# Patient Record
Sex: FEMALE | Race: WHITE | NOT HISPANIC OR LATINO | Employment: OTHER | URBAN - METROPOLITAN AREA
[De-identification: names, ages, dates, MRNs, and addresses within clinical notes are randomized per-mention and may not be internally consistent; named-entity substitution may affect disease eponyms.]

---

## 2018-09-28 PROBLEM — D68.59 OTHER PRIMARY THROMBOPHILIA (HCC): Status: ACTIVE | Noted: 2018-09-28

## 2018-09-28 PROBLEM — I80.292: Status: ACTIVE | Noted: 2018-09-28

## 2023-02-03 PROBLEM — I82.729: Status: ACTIVE | Noted: 2023-02-03

## 2023-02-03 PROBLEM — J01.90 ACUTE SINUSITIS: Status: ACTIVE | Noted: 2023-02-03

## 2023-02-03 PROBLEM — U07.1 COVID-19: Status: ACTIVE | Noted: 2023-02-03

## 2023-02-03 PROBLEM — R26.89 IMBALANCE: Status: ACTIVE | Noted: 2023-02-03

## 2023-02-03 PROBLEM — G57.90 NEUROPATHY, LEG: Status: ACTIVE | Noted: 2023-02-03

## 2023-02-03 PROBLEM — D68.51 FACTOR V LEIDEN (MULTI): Status: ACTIVE | Noted: 2023-02-03

## 2023-02-03 PROBLEM — M10.9 GOUT: Status: ACTIVE | Noted: 2023-02-03

## 2023-02-03 PROBLEM — M79.89 LEG SWELLING: Status: ACTIVE | Noted: 2023-02-03

## 2023-02-03 PROBLEM — L60.0 INGROWN TOENAIL: Status: ACTIVE | Noted: 2023-02-03

## 2023-02-03 PROBLEM — E66.9 CLASS 1 OBESITY WITH BODY MASS INDEX (BMI) OF 34.0 TO 34.9 IN ADULT: Status: ACTIVE | Noted: 2023-02-03

## 2023-02-03 PROBLEM — J20.9 ACUTE BRONCHITIS: Status: ACTIVE | Noted: 2023-02-03

## 2023-02-03 PROBLEM — R39.9 URINARY TRACT INFECTION SYMPTOMS: Status: ACTIVE | Noted: 2023-02-03

## 2023-02-03 PROBLEM — I10 ESSENTIAL HYPERTENSION: Status: ACTIVE | Noted: 2023-02-03

## 2023-02-03 PROBLEM — E55.9 VITAMIN D DEFICIENCY: Status: ACTIVE | Noted: 2023-02-03

## 2023-02-03 PROBLEM — M79.673 FOOT PAIN: Status: ACTIVE | Noted: 2023-02-03

## 2023-02-03 PROBLEM — F41.9 ANXIETY: Status: ACTIVE | Noted: 2023-02-03

## 2023-02-03 PROBLEM — M12.30 PALINDROMIC RHEUMATISM: Status: ACTIVE | Noted: 2023-02-03

## 2023-02-03 PROBLEM — R26.81 GAIT INSTABILITY: Status: ACTIVE | Noted: 2023-02-03

## 2023-02-03 PROBLEM — K21.9 GERD (GASTROESOPHAGEAL REFLUX DISEASE): Status: ACTIVE | Noted: 2023-02-03

## 2023-02-03 PROBLEM — R42 DIZZINESS: Status: ACTIVE | Noted: 2023-02-03

## 2023-02-03 PROBLEM — H93.8X9 BLOCKED EAR: Status: ACTIVE | Noted: 2023-02-03

## 2023-02-03 PROBLEM — R42 VERTIGO: Status: ACTIVE | Noted: 2023-02-03

## 2023-02-03 PROBLEM — E87.6 HYPOKALEMIA: Status: ACTIVE | Noted: 2023-02-03

## 2023-02-03 PROBLEM — H81.09 ENDOLYMPHATIC HYDROPS: Status: ACTIVE | Noted: 2023-02-03

## 2023-02-03 PROBLEM — E79.0 HYPERURICEMIA: Status: ACTIVE | Noted: 2023-02-03

## 2023-02-03 PROBLEM — F41.8 DEPRESSION WITH ANXIETY: Status: ACTIVE | Noted: 2023-02-03

## 2023-02-03 PROBLEM — H90.3 BILATERAL SENSORINEURAL HEARING LOSS: Status: ACTIVE | Noted: 2023-02-03

## 2023-02-03 PROBLEM — E78.5 ELEVATED LIPIDS: Status: ACTIVE | Noted: 2023-02-03

## 2023-02-03 PROBLEM — M19.90 ARTHRITIS: Status: ACTIVE | Noted: 2023-02-03

## 2023-02-03 PROBLEM — H60.90 OTITIS EXTERNA: Status: ACTIVE | Noted: 2023-02-03

## 2023-02-03 PROBLEM — R60.9 EDEMA: Status: ACTIVE | Noted: 2023-02-03

## 2023-02-03 PROBLEM — R10.9 ABDOMINAL PAIN, ACUTE: Status: ACTIVE | Noted: 2023-02-03

## 2023-02-03 PROBLEM — E66.811 CLASS 1 OBESITY WITH BODY MASS INDEX (BMI) OF 34.0 TO 34.9 IN ADULT: Status: ACTIVE | Noted: 2023-02-03

## 2023-02-03 RX ORDER — LORAZEPAM 0.5 MG/1
0.5 TABLET ORAL 2 TIMES DAILY PRN
COMMUNITY
Start: 2015-07-31 | End: 2023-03-17 | Stop reason: SDUPTHER

## 2023-02-03 RX ORDER — PANTOPRAZOLE SODIUM 40 MG/1
40 FOR SUSPENSION ORAL DAILY
COMMUNITY
Start: 2021-01-12

## 2023-02-03 RX ORDER — LANOLIN ALCOHOL/MO/W.PET/CERES
1 CREAM (GRAM) TOPICAL DAILY
COMMUNITY
Start: 2022-03-28

## 2023-02-03 RX ORDER — METHYLPREDNISOLONE 4 MG/1
TABLET ORAL
COMMUNITY
Start: 2022-03-28 | End: 2023-03-17 | Stop reason: ALTCHOICE

## 2023-02-03 RX ORDER — FUROSEMIDE 20 MG/1
1 TABLET ORAL DAILY
COMMUNITY
Start: 2015-07-31 | End: 2023-03-17 | Stop reason: SDUPTHER

## 2023-02-03 RX ORDER — WARFARIN 3 MG/1
1 TABLET ORAL
COMMUNITY
End: 2023-09-28 | Stop reason: SDUPTHER

## 2023-02-03 RX ORDER — LANOLIN ALCOHOL/MO/W.PET/CERES
1 CREAM (GRAM) TOPICAL DAILY
COMMUNITY
Start: 2021-06-22 | End: 2023-10-03 | Stop reason: ALTCHOICE

## 2023-02-03 RX ORDER — LISINOPRIL 10 MG/1
0.5 TABLET ORAL DAILY
COMMUNITY
Start: 2015-09-28 | End: 2024-02-01 | Stop reason: SDUPTHER

## 2023-02-03 RX ORDER — FOLIC ACID 1 MG/1
1 TABLET ORAL DAILY
COMMUNITY
Start: 2018-08-16 | End: 2023-10-03 | Stop reason: ALTCHOICE

## 2023-02-03 RX ORDER — ALLOPURINOL 300 MG/1
1 TABLET ORAL DAILY
COMMUNITY
Start: 2019-01-08 | End: 2023-03-17 | Stop reason: SDUPTHER

## 2023-02-03 RX ORDER — METHOCARBAMOL 750 MG/1
1 TABLET, FILM COATED ORAL 3 TIMES DAILY PRN
COMMUNITY
Start: 2021-07-06 | End: 2023-03-29 | Stop reason: SDUPTHER

## 2023-02-03 RX ORDER — POTASSIUM CHLORIDE 750 MG/1
1 TABLET, EXTENDED RELEASE ORAL DAILY
COMMUNITY
Start: 2018-03-13 | End: 2024-02-02 | Stop reason: SDUPTHER

## 2023-03-17 ENCOUNTER — OFFICE VISIT (OUTPATIENT)
Dept: PRIMARY CARE | Facility: CLINIC | Age: 78
End: 2023-03-17
Payer: MEDICARE

## 2023-03-17 VITALS
TEMPERATURE: 98.1 F | HEIGHT: 61 IN | WEIGHT: 181 LBS | DIASTOLIC BLOOD PRESSURE: 82 MMHG | HEART RATE: 90 BPM | BODY MASS INDEX: 34.17 KG/M2 | SYSTOLIC BLOOD PRESSURE: 128 MMHG

## 2023-03-17 DIAGNOSIS — E79.0 HYPERURICEMIA: ICD-10-CM

## 2023-03-17 DIAGNOSIS — R60.0 PEDAL EDEMA: ICD-10-CM

## 2023-03-17 DIAGNOSIS — I82.409 RECURRENT DEEP VEIN THROMBOSIS (DVT) (MULTI): ICD-10-CM

## 2023-03-17 DIAGNOSIS — F41.9 ANXIETY: ICD-10-CM

## 2023-03-17 DIAGNOSIS — I10 ESSENTIAL HYPERTENSION: ICD-10-CM

## 2023-03-17 DIAGNOSIS — E66.09 CLASS 1 OBESITY DUE TO EXCESS CALORIES WITH SERIOUS COMORBIDITY AND BODY MASS INDEX (BMI) OF 34.0 TO 34.9 IN ADULT: Primary | ICD-10-CM

## 2023-03-17 PROBLEM — I82.729: Status: RESOLVED | Noted: 2023-02-03 | Resolved: 2023-03-17

## 2023-03-17 PROCEDURE — 99214 OFFICE O/P EST MOD 30 MIN: CPT | Performed by: INTERNAL MEDICINE

## 2023-03-17 PROCEDURE — 1159F MED LIST DOCD IN RCRD: CPT | Performed by: INTERNAL MEDICINE

## 2023-03-17 PROCEDURE — 1160F RVW MEDS BY RX/DR IN RCRD: CPT | Performed by: INTERNAL MEDICINE

## 2023-03-17 PROCEDURE — 3079F DIAST BP 80-89 MM HG: CPT | Performed by: INTERNAL MEDICINE

## 2023-03-17 PROCEDURE — 3074F SYST BP LT 130 MM HG: CPT | Performed by: INTERNAL MEDICINE

## 2023-03-17 RX ORDER — FUROSEMIDE 20 MG/1
20 TABLET ORAL DAILY
Qty: 30 TABLET | Refills: 2 | Status: SHIPPED | OUTPATIENT
Start: 2023-03-17 | End: 2023-08-25 | Stop reason: SDUPTHER

## 2023-03-17 RX ORDER — ALLOPURINOL 300 MG/1
300 TABLET ORAL DAILY
Qty: 90 TABLET | Refills: 1 | Status: SHIPPED | OUTPATIENT
Start: 2023-03-17 | End: 2023-10-03 | Stop reason: ALTCHOICE

## 2023-03-17 RX ORDER — LORAZEPAM 0.5 MG/1
0.5 TABLET ORAL 2 TIMES DAILY PRN
Qty: 30 TABLET | Refills: 0 | Status: SHIPPED | OUTPATIENT
Start: 2023-03-17

## 2023-03-17 ASSESSMENT — ENCOUNTER SYMPTOMS
CARDIOVASCULAR NEGATIVE: 1
PSYCHIATRIC NEGATIVE: 1
OCCASIONAL FEELINGS OF UNSTEADINESS: 0
CONSTITUTIONAL NEGATIVE: 1
RESPIRATORY NEGATIVE: 1
NEUROLOGICAL NEGATIVE: 1
EYES NEGATIVE: 1
MUSCULOSKELETAL NEGATIVE: 1
ENDOCRINE NEGATIVE: 1
LOSS OF SENSATION IN FEET: 0
DEPRESSION: 0
GASTROINTESTINAL NEGATIVE: 1

## 2023-03-17 NOTE — PROGRESS NOTES
"Subjective   Patient ID: Keyana Barcenas is a 78 y.o. female who presents for Follow-up (Follow up/Needs refills/).    HPI     Review of Systems   Constitutional: Negative.    HENT: Negative.     Eyes: Negative.    Respiratory: Negative.     Cardiovascular: Negative.    Gastrointestinal: Negative.    Endocrine: Negative.    Genitourinary: Negative.    Musculoskeletal: Negative.    Neurological: Negative.    Psychiatric/Behavioral: Negative.     All other systems reviewed and are negative.      Objective   /82   Pulse 90   Temp 36.7 °C (98.1 °F) (Temporal)   Ht 1.549 m (5' 1\")   Wt 82.1 kg (181 lb)   BMI 34.20 kg/m²     Physical Exam  Vitals reviewed.   Constitutional:       Appearance: Normal appearance.   HENT:      Head: Normocephalic.   Eyes:      Extraocular Movements: Extraocular movements intact.      Pupils: Pupils are equal, round, and reactive to light.   Cardiovascular:      Rate and Rhythm: Normal rate and regular rhythm.   Pulmonary:      Effort: Pulmonary effort is normal.      Breath sounds: Normal breath sounds.   Abdominal:      Palpations: Abdomen is soft.   Musculoskeletal:         General: Normal range of motion.   Neurological:      General: No focal deficit present.      Mental Status: She is alert. She is disoriented.   Psychiatric:         Mood and Affect: Mood normal.         Behavior: Behavior normal.         Thought Content: Thought content normal.         Judgment: Judgment normal.         Assessment/Plan   Problem List Items Addressed This Visit          Circulatory    Essential hypertension     On Lisinopril ,follow DASH diet.         Recurrent deep vein thrombosis (DVT) (CMS/HCC)     Factor V ,on chronic warfarin.             Musculoskeletal    Pedal edema     Follow sodium diet plus Lasix as needed as needed         Relevant Medications    furosemide (Lasix) 20 mg tablet       Endocrine/Metabolic    Class 1 obesity with body mass index (BMI) of 34.0 to 34.9 in " adult - Primary     Diet exercise weight loss to reduce cardiometabolic risk.  Even losing 5 to 10 pounds can reduce risk of health related problems.            Other    Anxiety    Relevant Medications    LORazepam (Ativan) 0.5 mg tablet    Hyperuricemia    Relevant Medications    allopurinol (Zyloprim) 300 mg tablet

## 2023-03-17 NOTE — ASSESSMENT & PLAN NOTE
Diet exercise weight loss to reduce cardiometabolic risk.  Even losing 5 to 10 pounds can reduce risk of health related problems.

## 2023-03-29 DIAGNOSIS — M19.90 ARTHRITIS: ICD-10-CM

## 2023-03-29 RX ORDER — METHOCARBAMOL 750 MG/1
750 TABLET, FILM COATED ORAL 3 TIMES DAILY PRN
Qty: 30 TABLET | Refills: 1 | Status: SHIPPED | OUTPATIENT
Start: 2023-03-29 | End: 2024-02-02 | Stop reason: SDUPTHER

## 2023-08-25 DIAGNOSIS — R60.0 PEDAL EDEMA: ICD-10-CM

## 2023-08-30 RX ORDER — FUROSEMIDE 20 MG/1
20 TABLET ORAL DAILY
Qty: 90 TABLET | Refills: 1 | Status: SHIPPED | OUTPATIENT
Start: 2023-08-30 | End: 2024-02-02 | Stop reason: SDUPTHER

## 2023-09-22 ENCOUNTER — APPOINTMENT (OUTPATIENT)
Dept: PRIMARY CARE | Facility: CLINIC | Age: 78
End: 2023-09-22
Payer: MEDICARE

## 2023-09-28 DIAGNOSIS — M19.90 ARTHRITIS: ICD-10-CM

## 2023-09-28 RX ORDER — WARFARIN 3 MG/1
3 TABLET ORAL ONCE
Qty: 90 TABLET | Refills: 0 | Status: SHIPPED | OUTPATIENT
Start: 2023-09-28 | End: 2023-10-03 | Stop reason: ALTCHOICE

## 2023-10-03 ENCOUNTER — OFFICE VISIT (OUTPATIENT)
Dept: CARDIOLOGY | Facility: CLINIC | Age: 78
End: 2023-10-03
Payer: MEDICARE

## 2023-10-03 VITALS
WEIGHT: 183.6 LBS | HEART RATE: 84 BPM | SYSTOLIC BLOOD PRESSURE: 126 MMHG | HEIGHT: 59 IN | BODY MASS INDEX: 37.01 KG/M2 | DIASTOLIC BLOOD PRESSURE: 64 MMHG

## 2023-10-03 DIAGNOSIS — R07.9 CHEST PAIN, UNSPECIFIED TYPE: ICD-10-CM

## 2023-10-03 PROCEDURE — 1159F MED LIST DOCD IN RCRD: CPT | Performed by: INTERNAL MEDICINE

## 2023-10-03 PROCEDURE — 99204 OFFICE O/P NEW MOD 45 MIN: CPT | Performed by: INTERNAL MEDICINE

## 2023-10-03 PROCEDURE — 1036F TOBACCO NON-USER: CPT | Performed by: INTERNAL MEDICINE

## 2023-10-03 PROCEDURE — 99244 OFF/OP CNSLTJ NEW/EST MOD 40: CPT | Performed by: INTERNAL MEDICINE

## 2023-10-03 PROCEDURE — 3074F SYST BP LT 130 MM HG: CPT | Performed by: INTERNAL MEDICINE

## 2023-10-03 PROCEDURE — 3078F DIAST BP <80 MM HG: CPT | Performed by: INTERNAL MEDICINE

## 2023-10-03 PROCEDURE — 1160F RVW MEDS BY RX/DR IN RCRD: CPT | Performed by: INTERNAL MEDICINE

## 2023-10-03 RX ORDER — ALLOPURINOL 300 MG/1
1 TABLET ORAL DAILY
COMMUNITY
End: 2024-02-02 | Stop reason: SDUPTHER

## 2023-10-03 RX ORDER — REGADENOSON 0.08 MG/ML
0.4 INJECTION, SOLUTION INTRAVENOUS
Status: SHIPPED | OUTPATIENT
Start: 2023-10-03

## 2023-10-03 RX ORDER — FUROSEMIDE 20 MG/1
1 TABLET ORAL DAILY
COMMUNITY
End: 2023-10-03 | Stop reason: ALTCHOICE

## 2023-10-03 RX ORDER — FOLIC ACID 1 MG/1
1 TABLET ORAL DAILY
COMMUNITY

## 2023-10-03 RX ORDER — WARFARIN 4 MG/1
TABLET ORAL
COMMUNITY

## 2023-10-03 RX ORDER — LANOLIN ALCOHOL/MO/W.PET/CERES
1 CREAM (GRAM) TOPICAL DAILY
COMMUNITY

## 2023-10-03 RX ORDER — LISINOPRIL 10 MG/1
1 TABLET ORAL DAILY
COMMUNITY
End: 2023-10-03 | Stop reason: ALTCHOICE

## 2023-10-03 NOTE — PROGRESS NOTES
CARDIOLOGY OFFICE VISIT  78-year-old female asked to see us after her visit to the emergency room where she was having chest and arm pain.  She was seen by the hospital ER doctor.  Serial troponins were performed which were negative.  Symptoms resolved without much treatment.  She was given some fluids and some other occasions because of some lightheadedness and dizziness.  Her EKG showed no significant abnormalities.  She was discharged on her usual medications and asked to make a follow-up appointment.  Patient has not been here before.  Her  was a former patient of mine and I do know her family especially her daughter Carolyn who is a nurse at the hospital.  Presently she is feeling well.  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  She has various other minor medical conditions per se.  She is directly free of any coronary disease and apparently had a heart cath 25 years ago which was normal.  She is on no active cardiac medications and follows with her primary care physician and hematology.  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 (CMS/[facility]) 01/12/2021    Drug or chemical induced diabetes mellitus, controlled, with hyperglycemia    Gout, unspecified 05/04/2022    Gout    Personal history of other specified conditions 12/18/2016    History of fatigue    Personal history of other venous thrombosis and embolism 04/01/2019    History of deep venous thrombosis    Personal  history of other venous thrombosis and embolism 12/18/2016    History of deep venous thrombosis    Personal history of urinary (tract) infections 12/09/2016    History of UTI       Social History  Social History     Tobacco Use    Smoking status: Former     Packs/day: 0.50     Years: 25.00     Additional pack years: 0.00     Total pack years: 12.50     Types: Cigarettes    Smokeless tobacco: Never   Substance Use Topics    Alcohol use: Not Currently    Drug use: Never       Family History     Family History   Problem Relation Name Age of Onset    Coronary artery disease Mother      Breast cancer Mother      Rheum arthritis Mother      Colon cancer Son      Colon cancer Maternal Grandmother          Allergies:  Allergies   Allergen Reactions    Erythromycin Unknown    Erythromycin-Benzoyl Peroxide Unknown    Iodinated Contrast Media Unknown    Povidone-Iodine Unknown        Outpatient Medications:  Current Outpatient Medications   Medication Instructions    allopurinol (Zyloprim) 300 mg tablet 1 tablet, oral, Daily    cyanocobalamin (Vitamin B-12) 1,000 mcg tablet 1 tablet, oral, Daily, As directed    folic acid (Folvite) 1 mg tablet 1 tablet, oral, Daily    furosemide (LASIX) 20 mg, oral, Daily, As directed    lisinopril 10 mg tablet 0.5 tablets, oral, Daily    LORazepam (ATIVAN) 0.5 mg, oral, 2 times daily PRN    methocarbamol (ROBAXIN) 750 mg, oral, 3 times daily PRN    pantoprazole (PROTONIX) 40 mg, oral, Daily    potassium chloride CR 10 mEq ER tablet 1 tablet, oral, Daily    pyridoxine (Vitamin B-6) 50 mg tablet 1 tablet, oral, Daily    warfarin (Coumadin) 4 mg tablet TAKE 1 TABLET BY MOUTH DAILY FOR 5 DAYS AND 1/2 TABLET DAILY 2 DAYS A WEEK          REVIEW OF SYSTEMS  ROS      VITALS  Vitals:    10/03/23 1344   BP: 126/64   Pulse: 84       PHYSICAL EXAM  Constitutional:       Appearance: Healthy appearance. Not in distress.   Neck:      Vascular: No JVR. JVD normal.   Pulmonary:      Effort: Pulmonary effort  "is normal.      Breath sounds: Normal breath sounds. No wheezing. No rhonchi. No rales.   Chest:      Chest wall: Not tender to palpatation.   Cardiovascular:      PMI at left midclavicular line. Normal rate. Regular rhythm. Normal S1. Normal S2.       Murmurs: There is no murmur.      No gallop.  No click. No rub.   Pulses:     Intact distal pulses.   Edema:     Peripheral edema absent.   Abdominal:      General: Bowel sounds are normal.      Palpations: Abdomen is soft.      Tenderness: There is no abdominal tenderness.   Musculoskeletal: Normal range of motion.         General: No tenderness. Skin:     General: Skin is warm and dry.   Neurological:      General: No focal deficit present.      Mental Status: Alert and oriented to person, place and time.          REVIEW OF RECENT LABS AND TESTING  Lab Results   Component Value Date    GLUCOSE 118 (H) 09/29/2023    CALCIUM 8.7 09/29/2023     (L) 09/29/2023    K 3.6 09/29/2023    CO2 27 09/29/2023    CL 97 (L) 09/29/2023    BUN 15 09/29/2023    CREATININE 0.84 09/29/2023     Lab Results   Component Value Date    CKTOTAL 32 09/28/2023    TROPONINI 0.03 11/17/2021      Lab Results   Component Value Date    CHOL 187 06/28/2021    CHOL 201 (H) 07/24/2019     Lab Results   Component Value Date    HDL 49.0 06/28/2021    HDL 48.0 07/24/2019     No results found for: \"LDLCALC\"  Lab Results   Component Value Date    TRIG 120 06/28/2021    TRIG 164 (H) 07/24/2019     No components found for: \"CHOLHDL\"   Lab Results   Component Value Date    WBC 14.4 (H) 09/29/2023    HGB 13.3 09/29/2023    HCT 40.7 09/29/2023    MCV 96 09/29/2023     09/29/2023        Cardiac Studies    No echocardiogram results found for the past 12 months     No results found for this or any previous visit (from the past 4464 hour(s)).       Cath 25 years ago reportedly normal      Normal sinus rhythm with sinus arrhythmia  Left axis deviation  Poor R Wave Progression  Abnormal ECG  When " compared with ECG of 28-SEP-2023 22:07,  No significant change was found  Confirmed by Raj Garcia ([id number]) on 10/3/2023 2:06:23 PM   ASSESSMENT AND PLAN  78-year-old female seen and evaluated here in the office after recent emergency room visit for atypical chest pain.    Chart was reviewed in detail including EKGs labs hospital notes and details of the patient's past medical history discussed with the patient and her daughter.    Current meds allergies vitals and examination as noted.    Impression:  Atypical chest pain  History of normal heart cath  Obesity  Hypertension  Protein C deficiency and factor V Leiden deficiency  Chronic Coumadin therapy  Recurrent blood clots    Recommendation:  Continue current meds  Schedule perfusion stress test echo and calcium score  See me afterwards to review  Follow-up with primary care and hematology  INR in the 2-3 range advised  Call if any problems or issues develop    [unfilled]

## 2023-10-04 ENCOUNTER — TRANSCRIBE ORDERS (OUTPATIENT)
Dept: WOMENS IMAGING | Age: 78
End: 2023-10-04

## 2023-10-04 DIAGNOSIS — I80.202: Primary | ICD-10-CM

## 2023-10-04 DIAGNOSIS — I87.002 POSTTHROMBOTIC SYNDROME WITHOUT COMPLICATIONS OF LEFT LOWER EXTREMITY: ICD-10-CM

## 2023-10-05 ENCOUNTER — HOSPITAL ENCOUNTER (OUTPATIENT)
Dept: ULTRASOUND IMAGING | Age: 78
Discharge: HOME | End: 2023-10-07
Payer: MEDICARE

## 2023-10-05 ENCOUNTER — TELEPHONE (OUTPATIENT)
Dept: CARDIOLOGY | Facility: CLINIC | Age: 78
End: 2023-10-05

## 2023-10-05 DIAGNOSIS — R94.31 ABNORMAL EKG: ICD-10-CM

## 2023-10-05 DIAGNOSIS — M79.602 PAIN OF LEFT UPPER EXTREMITY: ICD-10-CM

## 2023-10-05 PROCEDURE — 93971 EXTREMITY STUDY: CPT

## 2023-10-05 NOTE — TELEPHONE ENCOUNTER
Dara from scheduling called asking if the ECHO orders will be placed for this patient. She noticed Dr. Garcia note stated he wanted patient to get ECHO.

## 2023-10-06 ENCOUNTER — APPOINTMENT (OUTPATIENT)
Dept: PRIMARY CARE | Facility: CLINIC | Age: 78
End: 2023-10-06
Payer: MEDICARE

## 2023-10-06 DIAGNOSIS — R94.31 ABNORMAL EKG: Primary | ICD-10-CM

## 2023-10-06 DIAGNOSIS — R07.9 CHEST PAIN, UNSPECIFIED TYPE: ICD-10-CM

## 2023-10-06 NOTE — PROGRESS NOTES
Dr. Raj Garcia DO ordered ECHO at last OV. Request to place in charge was given and done. Sent to Dr. Raj Garcia DO for sign off.    Location of patient: [place]    Received call from Chelsie Robert at [facility] with [employer]. Subjective: Caller states \"the left side of my face is swollen, I have neck pain under my left ear and swelling and tingling under my left eye\"     I just started taking 5 mg Amlodipine on 3/17 which was ordered by my cardiologist    When I turn my neck I have pain- it feels like something is pulling    Current Symptoms:     Onset: 2 days ago; worsening    Associated Symptoms:     Pain Severity: 3/10; sharp, pulling; intermittent    Temperature:      What has been tried: Benadryl    LMP: NA Pregnant: NA    Recommended disposition: Go to [facility]    Care advice provided, patient verbalizes understanding; denies any other questions or concerns; instructed to call back for any new or worsening symptoms. Patient/Caller agrees with recommended disposition; writer provided warm transfer to Dale Sandhu at [facility] for appointment scheduling    Attention Provider: Thank you for allowing me to participate in the care of your patient. The patient was connected to triage in response to information provided to the ECC/PSC. Please do not respond through this encounter as the response is not directed to a shared pool. Reason for Disposition   Face swelling began after taking a drug    Protocols used:  Face Swelling-ADULT-[place]

## 2023-10-09 ENCOUNTER — APPOINTMENT (OUTPATIENT)
Dept: ORTHOPEDIC SURGERY | Facility: CLINIC | Age: 78
End: 2023-10-09
Payer: MEDICARE

## 2023-10-17 ENCOUNTER — APPOINTMENT (OUTPATIENT)
Dept: PRIMARY CARE | Facility: CLINIC | Age: 78
End: 2023-10-17
Payer: MEDICARE

## 2023-10-20 ENCOUNTER — OFFICE VISIT (OUTPATIENT)
Dept: ORTHOPEDIC SURGERY | Facility: CLINIC | Age: 78
End: 2023-10-20
Payer: MEDICARE

## 2023-10-20 VITALS — HEIGHT: 59 IN | BODY MASS INDEX: 36.49 KG/M2 | WEIGHT: 181 LBS

## 2023-10-20 DIAGNOSIS — M19.019 GLENOHUMERAL ARTHRITIS: Primary | ICD-10-CM

## 2023-10-20 PROCEDURE — 20610 DRAIN/INJ JOINT/BURSA W/O US: CPT | Mod: LT | Performed by: ORTHOPAEDIC SURGERY

## 2023-10-20 PROCEDURE — 2500000005 HC RX 250 GENERAL PHARMACY W/O HCPCS: Performed by: ORTHOPAEDIC SURGERY

## 2023-10-20 PROCEDURE — 1160F RVW MEDS BY RX/DR IN RCRD: CPT | Performed by: ORTHOPAEDIC SURGERY

## 2023-10-20 PROCEDURE — 99213 OFFICE O/P EST LOW 20 MIN: CPT | Performed by: ORTHOPAEDIC SURGERY

## 2023-10-20 PROCEDURE — 1036F TOBACCO NON-USER: CPT | Performed by: ORTHOPAEDIC SURGERY

## 2023-10-20 PROCEDURE — 1125F AMNT PAIN NOTED PAIN PRSNT: CPT | Performed by: ORTHOPAEDIC SURGERY

## 2023-10-20 PROCEDURE — 99203 OFFICE O/P NEW LOW 30 MIN: CPT | Performed by: ORTHOPAEDIC SURGERY

## 2023-10-20 PROCEDURE — 1159F MED LIST DOCD IN RCRD: CPT | Performed by: ORTHOPAEDIC SURGERY

## 2023-10-20 PROCEDURE — 2500000004 HC RX 250 GENERAL PHARMACY W/ HCPCS (ALT 636 FOR OP/ED): Performed by: ORTHOPAEDIC SURGERY

## 2023-10-20 RX ORDER — TRIAMCINOLONE ACETONIDE 40 MG/ML
40 INJECTION, SUSPENSION INTRA-ARTICULAR; INTRAMUSCULAR
Status: COMPLETED | OUTPATIENT
Start: 2023-10-20 | End: 2023-10-20

## 2023-10-20 RX ORDER — LIDOCAINE HYDROCHLORIDE 10 MG/ML
8 INJECTION INFILTRATION; PERINEURAL
Status: COMPLETED | OUTPATIENT
Start: 2023-10-20 | End: 2023-10-20

## 2023-10-20 RX ADMIN — LIDOCAINE HYDROCHLORIDE 8 ML: 10 INJECTION, SOLUTION INFILTRATION; PERINEURAL at 12:27

## 2023-10-20 RX ADMIN — TRIAMCINOLONE ACETONIDE 40 MG: 40 INJECTION, SUSPENSION INTRA-ARTICULAR; INTRAMUSCULAR at 12:27

## 2023-10-20 ASSESSMENT — PAIN SCALES - GENERAL: PAINLEVEL_OUTOF10: 3

## 2023-10-20 ASSESSMENT — PAIN - FUNCTIONAL ASSESSMENT: PAIN_FUNCTIONAL_ASSESSMENT: 0-10

## 2023-10-20 NOTE — PROGRESS NOTES
History of Present Illness   Patient presents with left shoulder pain has been acting up the past few months.  She has a history of cortisone shot that did give her relief.  She is requesting a new cortisone shot today.  She currently not taking anti-inflammatories she is currently on Coumadin     Review of Systems   GENERAL: Negative for malaise, significant weight loss, fever  MUSCULOSKELETAL: see HPI  NEURO:  Negative     Assessment   Left shoulder glenohumeral osteoarthritis     Plan  Performed a cortisone shot to the left shoulder today.  Patient had immediate relief and improvement of her range of motion after the injection  See her back in 1 month for follow-up      History of Present Illness   Chief Complaint   Patient presents with    Right Shoulder - Pain     Rt shoulder pain, xrays at        History of Present Illness   Patient presents with left shoulder pain has been acting up the past few months.  She has a history of cortisone shot that did give her relief.  She is requesting a new cortisone shot today.  She currently not taking anti-inflammatories she is currently on Coumadin.  Pain relieved by rest and aggravated by use    Imaging  Shoulder: No acute fractures dislocations.  Moderate glenohumeral arthritis     Assessment:   Left shoulder arthritis    Plan:  We reviewed the role of imaging, physical therapy, injections and the time frame to healing and correlation with outcome.  1.  Patient like to do a cortisone injection today  2.  NSAID: Deferred secondary to Coumadin.  3.  Ice: 30 minutes on and off  4.  Exercise home program: Medically directed Shoulder therapy / Handout given  5.  Injection options discussed   L Inj/Asp: L glenohumeral on 10/20/2023 12:27 PM  Indications: pain  Details: 22 G needle, posterior approach  Medications: 8 mL lidocaine 10 mg/mL (1 %); 40 mg triamcinolone acetonide 40 mg/mL  Outcome: tolerated well, no immediate complications  Procedure, treatment alternatives,  risks and benefits explained, specific risks discussed. Consent was given by the patient. Immediately prior to procedure a time out was called to verify the correct patient, procedure, equipment, support staff and site/side marked as required. Patient was prepped and draped in the usual sterile fashion.          Physical Exam  Well-nourished, well-developed. No acute distress. Alert and oriented x3. Responds appropriately to questioning. Good mood. Normal affect.  Physical Exam  Left shoulder:  Skin healthy to gross inspection. No ecchymosis, no swelling, no gross atrophy  No tenderness to palpation over acromioclavicular joint  Mild pain in the biceps  ROM: 130 forward flexion, external rotation 20 degrees  Strength: 5/5 Resisted elevation, external rotation     Pain with lift off and Speeds test + impingement  Negative Spurling´s test  Positive Neer and Hawkin´s test  Neurovascular exam normal distally     Review of Systems   GENERAL: Negative for malaise, significant weight loss, fever  MUSCULOSKELETAL: See HPI  NEURO:  Negative     Past Medical History:   Diagnosis Date    Activated protein C resistance (CMS/[facility]) 12/18/2016    Factor 5 Leiden mutation, heterozygous    Anxiety disorder, unspecified 03/28/2022    Anxiety    Cervicalgia     Neck pain    Deficiency of other specified B group vitamins 12/18/2016    Vitamin B12 deficiency    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 (CMS/[facility]) 01/12/2021    Drug or chemical induced diabetes mellitus, controlled, with hyperglycemia    Gout, unspecified 05/04/2022    Gout    Personal history of other specified conditions 12/18/2016    History of fatigue    Personal history of other venous thrombosis and embolism 04/01/2019    History of deep venous thrombosis    Personal history of other venous thrombosis and embolism 12/18/2016    History of deep venous thrombosis    Personal history of urinary (tract) infections 12/09/2016    History of UTI        Medication Documentation Review Audit       Reviewed by Gail Escalera MA (Medical Assistant) on 10/20/23 at 0937      Medication Order Taking? Sig Documenting Provider Last Dose Status   allopurinol (Zyloprim) 300 mg tablet [id number] No Take 1 tablet (300 mg) by mouth once daily. Historical Provider, MD Taking Active   cyanocobalamin (Vitamin B-12) 1,000 mcg tablet [id number] No Take 1 tablet (1,000 mcg) by mouth once daily. As directed Historical Provider, MD Taking Active   folic acid (Folvite) 1 mg tablet [id number] No Take 1 tablet (1 mg) by mouth once daily. Historical Provider, MD Taking Active   furosemide (Lasix) 20 mg tablet [id number] No Take 1 tablet (20 mg) by mouth once daily. As directed Marj Weston MD Taking Active   lisinopril 10 mg tablet [id number] No Take 0.5 tablets (5 mg) by mouth once daily. Historical Provider, MD Taking Active   LORazepam (Ativan) 0.5 mg tablet [id number] No Take 1 tablet (0.5 mg) by mouth 2 times a day as needed for anxiety. Marj Weston MD Taking Active   methocarbamol (Robaxin) 750 mg tablet [id number] No Take 1 tablet (750 mg) by mouth 3 times a day as needed for muscle spasms. Marj Weston MD Taking Active   pantoprazole (ProtoNix) 40 mg packet [id number] No Take 1 packet (40 mg) by mouth once daily. Historical MD Cristobal Not Taking Active   potassium chloride CR 10 mEq ER tablet [id number] No Take 1 tablet (10 mEq) by mouth once daily. Mono Provider, MD Taking Active   pyridoxine (Vitamin B-6) 50 mg tablet [id number] No Take 1 tablet (50 mg) by mouth once daily. Historical Provider, MD Taking Active   regadenoson (Lexiscan) injection 0.4 mg [id number]   Raj Garcia,   Active   warfarin (Coumadin) 4 mg tablet [id number] No TAKE 1 TABLET BY MOUTH DAILY FOR 5 DAYS AND 1/2 TABLET DAILY 2 DAYS A WEEK Historical Provider, MD Taking Active                    Allergies   Allergen Reactions    Erythromycin Unknown    Erythromycin-Benzoyl Peroxide  Unknown    Iodinated Contrast Media Unknown    Povidone-Iodine Unknown       Social History     Socioeconomic History    Marital status:      Spouse name: Not on file    Number of children: Not on file    Years of education: Not on file    Highest education level: Not on file   Occupational History    Not on file   Tobacco Use    Smoking status: Former     Packs/day: 0.50     Years: 25.00     Additional pack years: 0.00     Total pack years: 12.50     Types: Cigarettes    Smokeless tobacco: Never   Substance and Sexual Activity    Alcohol use: Not Currently    Drug use: Never    Sexual activity: Not on file   Other Topics Concern    Not on file   Social History Narrative    Not on file     Social Determinants of Health     Financial Resource Strain: Not on file   Food Insecurity: Not on file   Transportation Needs: Not on file   Physical Activity: Not on file   Stress: Not on file   Social Connections: Not on file   Intimate Partner Violence: Not on file   Housing Stability: Not on file       Past Surgical History:   Procedure Laterality Date    BLADDER SURGERY  12/18/2016    Bladder Surgery    CARDIAC CATHETERIZATION      CATARACT EXTRACTION  12/18/2016    Cataract Extraction    FL DECLOT BY THROMOBOLYTIC AGENT OF VAD OR CATHETER GUIDANCE Left     HYSTERECTOMY  12/18/2016    Hysterectomy    TONSILLECTOMY  12/18/2016    Tonsillectomy       Vascular duplex upper extremity venous left    Result Date: 10/5/2023  EXAMINATION: VENOUS ULTRASOUND OF THE LEFT UPPER EXTREMITY, 10/5/2023 11:02 am TECHNIQUE: Duplex ultrasound using B-mode/gray scaled imaging and Doppler spectral analysis and color flow was obtained of the deep venous structures of the left upper extremity. COMPARISON: None. HISTORY: ORDERING SYSTEM PROVIDED HISTORY: LUE pain, history of DVTs FINDINGS: There is normal flow and compressibility of the visualized venous structures. There is no evidence of echogenic thrombus. The veins demonstrate good  compressibility with normal color flow study and spectral analysis.    No evidence of DVT in left upper extremity.    Electrocardiogram 12 Lead    Result Date: 10/3/2023  Normal sinus rhythm with sinus arrhythmia Left anterior fascicular block Poor R Wave Progression Abnormal ECG Confirmed by Raj Garcia ([id number]) on 10/3/2023 2:06:51 PM    Electrocardiogram 12 Lead    Result Date: 10/3/2023  Normal sinus rhythm with sinus arrhythmia Left axis deviation Poor R Wave Progression Abnormal ECG When compared with ECG of 28-SEP-2023 22:07, No significant change was found Confirmed by Raj Garcia ([id number]) on 10/3/2023 2:06:23 PM    CT head wo IV contrast    Result Date: 9/29/2023  Interpreted By:  SAVI MARTINEZ MD MRN: [id number] Patient Name: VELIA ALDRIDGE  STUDY: CT HEAD WO CONTRAST;  9/29/2023 12:51 pm  INDICATION: lightheaded, elevated INR .  COMPARISON: 08/05/2022 reporting no abnormality.  ACCESSION NUMBER(S): [id number]  ORDERING CLINICIAN: DANIELLE LOPEZ  TECHNIQUE: Noncontrast axial CT scan of head was performed. Angled reformats in brain and bone windows were generated. The images were reviewed in bone, brain, blood and soft tissue windows.  FINDINGS: No acute edema. No acute hemorrhage. No mass effect. Ventricular system is normal for age. No extra-axial fluid collections. Orbits are normal. Paranasal sinuses are clear.       No acute findings.    XR chest 1 view    Result Date: 9/29/2023  Interpreted By:  JONAH GAMEZ MD STUDY: Chest Radiograph;  9/29/2023, 10:22AM.  INDICATION: Chest pain.  COMPARISON: CT CAP 8/5/2022.  CXR 11/17/2021.  ACCESSION NUMBER(S): [id number]  ORDERING CLINICIAN: DANIELLE LOPEZ MD  TECHNIQUE:  Frontal chest was obtained at 11:04 hours.  FINDINGS:  CARDIOMEDIASTINAL SILHOUETTE: Cardiomediastinal silhouette is normal in size and configuration. There is a retrocardiac mass consistent with a hiatal hernia.  LUNGS: Lungs are clear.  ABDOMEN: No remarkable upper  abdominal findings.  BONES: No acute osseous changes.      No evidence consolidating infiltrate or effusion.   Signed by David Henderson MD    XR shoulder    Result Date: 9/28/2023  Interpreted By:  ELIZABETH JOHN MD MRN: [id number] Patient Name: VELIA ALDRIDGE  STUDY: WRIST; 2 VIEWS; SHOULDER, CMPLT, MIN 2 VIEWS; Left;  9/28/2023 10:37 pm  INDICATION: pain over distal radial styloid ; eval for fx. pain over proximal humeral head and scapula .  COMPARISON: None.  ACCESSION NUMBER(S): [id number]; [id number]  ORDERING CLINICIAN: MARLYNAID MARIA M  FINDINGS: Left wrist: Demineralization soft tissue swelling. Distal radioulnar joint osteoarthritic change as well as some cystic change in the lunate suggesting abutment  No definite fracture is seen. Demineralization does limit sensitivity. Severe base of thumb osteoarthritis.  Left shoulder: Severe left glenohumeral osteoarthritis is seen. Probable joint effusion detected. No acute fracture or malalignment.      Severe left glenohumeral osteoarthritic change. Demineralization. Soft tissue swelling seen about the left wrist with degenerative change. No acute fracture. If concern for synovitis, consider MRI   MACRO: None    XR wrist    Result Date: 9/28/2023  Interpreted By:  ELIZABETH JOHN MD MRN: [id number] Patient Name: VELIA ALDRIDGE  STUDY: WRIST; 2 VIEWS; SHOULDER, CMPLT, MIN 2 VIEWS; Left;  9/28/2023 10:37 pm  INDICATION: pain over distal radial styloid ; eval for fx. pain over proximal humeral head and scapula .  COMPARISON: None.  ACCESSION NUMBER(S): [id number]; [id number]  ORDERING CLINICIAN: ANASTACIO MARIA M  FINDINGS: Left wrist: Demineralization soft tissue swelling. Distal radioulnar joint osteoarthritic change as well as some cystic change in the lunate suggesting abutment  No definite fracture is seen. Demineralization does limit sensitivity. Severe base of thumb osteoarthritis.  Left shoulder: Severe left glenohumeral osteoarthritis is seen. Probable joint effusion  detected. No acute fracture or malalignment.      Severe left glenohumeral osteoarthritic change. Demineralization. Soft tissue swelling seen about the left wrist with degenerative change. No acute fracture. If concern for synovitis, consider MRI   MACRO: None

## 2023-11-08 ENCOUNTER — APPOINTMENT (OUTPATIENT)
Dept: RADIOLOGY | Facility: CLINIC | Age: 78
End: 2023-11-08
Payer: MEDICARE

## 2023-11-08 ENCOUNTER — APPOINTMENT (OUTPATIENT)
Dept: CARDIOLOGY | Facility: CLINIC | Age: 78
End: 2023-11-08
Payer: MEDICARE

## 2023-11-10 ENCOUNTER — APPOINTMENT (OUTPATIENT)
Dept: ORTHOPEDIC SURGERY | Facility: CLINIC | Age: 78
End: 2023-11-10
Payer: MEDICARE

## 2023-11-13 ENCOUNTER — APPOINTMENT (OUTPATIENT)
Dept: PRIMARY CARE | Facility: CLINIC | Age: 78
End: 2023-11-13
Payer: MEDICARE

## 2023-11-15 ENCOUNTER — APPOINTMENT (OUTPATIENT)
Dept: PRIMARY CARE | Facility: CLINIC | Age: 78
End: 2023-11-15
Payer: MEDICARE

## 2023-12-04 DIAGNOSIS — R60.0 PEDAL EDEMA: ICD-10-CM

## 2023-12-04 RX ORDER — FUROSEMIDE 20 MG/1
20 TABLET ORAL DAILY
Qty: 90 TABLET | Refills: 1 | OUTPATIENT
Start: 2023-12-04

## 2023-12-05 ENCOUNTER — APPOINTMENT (OUTPATIENT)
Dept: CARDIOLOGY | Facility: CLINIC | Age: 78
End: 2023-12-05
Payer: MEDICARE

## 2024-01-11 ENCOUNTER — APPOINTMENT (OUTPATIENT)
Dept: PRIMARY CARE | Facility: CLINIC | Age: 79
End: 2024-01-11
Payer: MEDICARE

## 2024-01-29 ENCOUNTER — APPOINTMENT (OUTPATIENT)
Dept: PRIMARY CARE | Facility: CLINIC | Age: 79
End: 2024-01-29
Payer: MEDICARE

## 2024-01-29 ENCOUNTER — APPOINTMENT (OUTPATIENT)
Dept: ORTHOPEDIC SURGERY | Facility: CLINIC | Age: 79
End: 2024-01-29
Payer: MEDICARE

## 2024-02-01 ENCOUNTER — OFFICE VISIT (OUTPATIENT)
Dept: PRIMARY CARE | Facility: CLINIC | Age: 79
End: 2024-02-01
Payer: MEDICARE

## 2024-02-01 VITALS
DIASTOLIC BLOOD PRESSURE: 90 MMHG | SYSTOLIC BLOOD PRESSURE: 144 MMHG | HEART RATE: 72 BPM | BODY MASS INDEX: 35.28 KG/M2 | HEIGHT: 59 IN | TEMPERATURE: 97.8 F | WEIGHT: 175 LBS

## 2024-02-01 DIAGNOSIS — Z00.00 ROUTINE GENERAL MEDICAL EXAMINATION AT HEALTH CARE FACILITY: ICD-10-CM

## 2024-02-01 DIAGNOSIS — Z12.31 ENCOUNTER FOR SCREENING MAMMOGRAM FOR BREAST CANCER: ICD-10-CM

## 2024-02-01 DIAGNOSIS — I82.409 RECURRENT DEEP VEIN THROMBOSIS (DVT) (MULTI): ICD-10-CM

## 2024-02-01 DIAGNOSIS — I10 ESSENTIAL HYPERTENSION: ICD-10-CM

## 2024-02-01 DIAGNOSIS — F41.8 DEPRESSION WITH ANXIETY: ICD-10-CM

## 2024-02-01 DIAGNOSIS — K21.9 GASTROESOPHAGEAL REFLUX DISEASE WITHOUT ESOPHAGITIS: ICD-10-CM

## 2024-02-01 DIAGNOSIS — D68.51 FACTOR V LEIDEN (MULTI): ICD-10-CM

## 2024-02-01 DIAGNOSIS — Z00.00 MEDICARE ANNUAL WELLNESS VISIT, SUBSEQUENT: Primary | ICD-10-CM

## 2024-02-01 PROCEDURE — 1160F RVW MEDS BY RX/DR IN RCRD: CPT | Performed by: INTERNAL MEDICINE

## 2024-02-01 PROCEDURE — 1036F TOBACCO NON-USER: CPT | Performed by: INTERNAL MEDICINE

## 2024-02-01 PROCEDURE — 99397 PER PM REEVAL EST PAT 65+ YR: CPT | Performed by: INTERNAL MEDICINE

## 2024-02-01 PROCEDURE — G0439 PPPS, SUBSEQ VISIT: HCPCS | Performed by: INTERNAL MEDICINE

## 2024-02-01 PROCEDURE — 3077F SYST BP >= 140 MM HG: CPT | Performed by: INTERNAL MEDICINE

## 2024-02-01 PROCEDURE — 1125F AMNT PAIN NOTED PAIN PRSNT: CPT | Performed by: INTERNAL MEDICINE

## 2024-02-01 PROCEDURE — 3080F DIAST BP >= 90 MM HG: CPT | Performed by: INTERNAL MEDICINE

## 2024-02-01 PROCEDURE — 1159F MED LIST DOCD IN RCRD: CPT | Performed by: INTERNAL MEDICINE

## 2024-02-01 RX ORDER — LISINOPRIL 10 MG/1
10 TABLET ORAL DAILY
Qty: 90 TABLET | Refills: 1 | Status: SHIPPED | OUTPATIENT
Start: 2024-02-01

## 2024-02-01 ASSESSMENT — ENCOUNTER SYMPTOMS
RESPIRATORY NEGATIVE: 1
CONSTITUTIONAL NEGATIVE: 1
NEUROLOGICAL NEGATIVE: 1
CARDIOVASCULAR NEGATIVE: 1

## 2024-02-01 NOTE — PROGRESS NOTES
"Subjective   Reason for Visit: Keyana Barcenas is an 78 y.o. female here for a Medicare Wellness visit.          Reviewed all medications by prescribing practitioner or clinical pharmacist (such as prescriptions, OTCs, herbal therapies and supplements) and documented in the medical record.    HPI patient is here for Medicare wellness exam she has factor V Leiden gene mutation and is on warfarin she still gets left leg swelling venous duplex was negative done few weeks ago by her hematologist.  She does have chronic anxiety issues she is still working and trying to keep herself active she has not fallen in last 6 months    Patient Care Team:  Marj Weston MD as PCP - General  Marj Weston MD as PCP - [employer] PCP     Review of Systems   Constitutional: Negative.    HENT: Negative.     Respiratory: Negative.     Cardiovascular: Negative.    Neurological: Negative.    Psychiatric/Behavioral:          Anxiety   All other systems reviewed and are negative.      Objective   Vitals:  /90   Pulse 72   Temp 36.6 °C (97.8 °F) (Temporal)   Ht 1.499 m (4' 11\")   Wt 79.4 kg (175 lb)   BMI 35.35 kg/m²       Physical Exam  Eyes:      Conjunctiva/sclera: Conjunctivae normal.   Cardiovascular:      Rate and Rhythm: Normal rate and regular rhythm.   Pulmonary:      Effort: Pulmonary effort is normal.      Breath sounds: Normal breath sounds.   Abdominal:      Palpations: Abdomen is soft.   Musculoskeletal:         General: No swelling.      Right lower leg: No edema.      Left lower leg: No edema.   Neurological:      General: No focal deficit present.      Mental Status: She is oriented to person, place, and time.   Psychiatric:         Mood and Affect: Mood normal.         Behavior: Behavior normal.       Assessment/Plan   Problem List Items Addressed This Visit    None  Visit Diagnoses       Routine general medical examination at health care facility    -  Primary    " Encounter for screening mammogram for breast cancer        Relevant Orders    BI mammo bilateral screening tomosynthesis          Patient is on warfarin for A-fib she has suboptimal control of blood pressure we advised her to take 10 mg lisinopril she also takes furosemide for lower extremity edema.  She is on allopurinol for hyperuricemia and gout.  She takes methocarbamol as a muscle relaxant for her back pain issues.

## 2024-02-02 DIAGNOSIS — M19.90 ARTHRITIS: ICD-10-CM

## 2024-02-02 DIAGNOSIS — R60.0 PEDAL EDEMA: ICD-10-CM

## 2024-02-02 DIAGNOSIS — F41.9 ANXIETY: ICD-10-CM

## 2024-02-02 RX ORDER — LORAZEPAM 0.5 MG/1
0.5 TABLET ORAL 2 TIMES DAILY PRN
Qty: 30 TABLET | Refills: 0 | Status: CANCELLED | OUTPATIENT
Start: 2024-02-02

## 2024-02-05 RX ORDER — POTASSIUM CHLORIDE 750 MG/1
10 TABLET, FILM COATED, EXTENDED RELEASE ORAL DAILY
Qty: 60 TABLET | Refills: 1 | Status: SHIPPED | OUTPATIENT
Start: 2024-02-05

## 2024-02-05 RX ORDER — ALLOPURINOL 300 MG/1
300 TABLET ORAL DAILY
Qty: 60 TABLET | Refills: 1 | Status: SHIPPED | OUTPATIENT
Start: 2024-02-05 | End: 2024-06-11 | Stop reason: SDUPTHER

## 2024-02-05 RX ORDER — METHOCARBAMOL 750 MG/1
750 TABLET, FILM COATED ORAL 3 TIMES DAILY PRN
Qty: 60 TABLET | Refills: 1 | Status: SHIPPED | OUTPATIENT
Start: 2024-02-05

## 2024-02-05 RX ORDER — FUROSEMIDE 20 MG/1
20 TABLET ORAL DAILY
Qty: 90 TABLET | Refills: 1 | Status: SHIPPED | OUTPATIENT
Start: 2024-02-05 | End: 2024-02-07 | Stop reason: SDUPTHER

## 2024-02-07 DIAGNOSIS — R60.0 PEDAL EDEMA: ICD-10-CM

## 2024-02-07 RX ORDER — FUROSEMIDE 20 MG/1
20 TABLET ORAL DAILY
Qty: 90 TABLET | Refills: 2 | Status: SHIPPED | OUTPATIENT
Start: 2024-02-07

## 2024-02-12 DIAGNOSIS — F41.9 ANXIETY: ICD-10-CM

## 2024-02-12 RX ORDER — LORAZEPAM 0.5 MG/1
0.5 TABLET ORAL 2 TIMES DAILY PRN
Qty: 60 TABLET | Refills: 0 | OUTPATIENT
Start: 2024-02-12

## 2024-02-15 ENCOUNTER — HOSPITAL ENCOUNTER (OUTPATIENT)
Dept: RADIOLOGY | Facility: HOSPITAL | Age: 79
Discharge: HOME | End: 2024-02-15
Payer: MEDICARE

## 2024-02-15 DIAGNOSIS — I80.202: ICD-10-CM

## 2024-02-15 DIAGNOSIS — I87.002 POSTTHROMBOTIC SYNDROME WITHOUT COMPLICATIONS OF LEFT LOWER EXTREMITY: ICD-10-CM

## 2024-02-15 PROCEDURE — 93971 EXTREMITY STUDY: CPT

## 2024-02-19 ENCOUNTER — APPOINTMENT (OUTPATIENT)
Dept: ORTHOPEDIC SURGERY | Facility: CLINIC | Age: 79
End: 2024-02-19
Payer: MEDICARE

## 2024-04-08 ENCOUNTER — APPOINTMENT (OUTPATIENT)
Dept: ORTHOPEDIC SURGERY | Facility: CLINIC | Age: 79
End: 2024-04-08
Payer: MEDICARE

## 2024-05-06 ENCOUNTER — APPOINTMENT (OUTPATIENT)
Dept: ORTHOPEDIC SURGERY | Facility: CLINIC | Age: 79
End: 2024-05-06
Payer: MEDICARE

## 2024-06-07 ENCOUNTER — APPOINTMENT (OUTPATIENT)
Dept: ORTHOPEDIC SURGERY | Facility: CLINIC | Age: 79
End: 2024-06-07
Payer: MEDICARE

## 2024-06-07 NOTE — PROGRESS NOTES
Chief Complaint   Patient presents with    Right Shoulder - Pain, Follow-up     Glenohumeral arthritis, s/p cortisone injection 10/20/23     History of Present Illness:   Left shoulder injection 10/20/23. Her left shoulder pain relieved by rest and aggravated by use. She is requesting a cortisone shot today in her left shoulder but notes that her right shoulder is beginning to bother her again.  She currently not taking anti-inflammatories she is currently on Coumadin.      Imaging:  Shoulder: No acute fractures dislocations.  Moderate glenohumeral arthritis     Assessment:   Left shoulder arthritis flare    Plan:  We reviewed the role of imaging, physical therapy, injections and the time frame to healing and correlation with outcome.  1.  Performed a cortisone shot to the left shoulder today. Follow-up in 4 weeks for right shoulder.   2.  Medrol Dosepak for 5 days with refills.    3.  NSAID: Celebrex sent to pharmacy. Deferred secondary to Coumadin.  4.  Ice: 60 minutes on and off  5.  Exercise home program: Medically directed Shoulder therapy / Handout given  L Inj/Asp: L glenohumeral on 6/10/2024 6:12 PM  Indications: pain  Details: 22 G needle, posterior approach  Medications: 8 mL lidocaine 10 mg/mL (1 %); 40 mg triamcinolone acetonide 40 mg/mL  Outcome: tolerated well, no immediate complications  Procedure, treatment alternatives, risks and benefits explained, specific risks discussed. Consent was given by the patient. Immediately prior to procedure a time out was called to verify the correct patient, procedure, equipment, support staff and site/side marked as required. Patient was prepped and draped in the usual sterile fashion.             Physical Exam:  Well-nourished, well-developed. No acute distress. Alert and oriented x3. Responds appropriately to questioning. Good mood. Normal affect.  Physical Exam  Left shoulder:  Skin healthy to gross inspection. 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    Factor 5 Leiden mutation, heterozygous    Anxiety disorder, unspecified 03/28/2022    Anxiety    Cervicalgia     Neck pain    Deficiency of other specified B group vitamins 12/18/2016    Vitamin B12 deficiency    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    Drug or chemical induced diabetes mellitus, controlled, with hyperglycemia    Gout, unspecified 05/04/2022    Gout    Personal history of other specified conditions 12/18/2016    History of fatigue    Personal history of other venous thrombosis and embolism 04/01/2019    History of deep venous thrombosis    Personal history of other venous thrombosis and embolism 12/18/2016    History of deep venous thrombosis    Personal history of urinary (tract) infections 12/09/2016    History of UTI       Medication Documentation Review Audit       Reviewed by Chetna Canela MA (Medical Assistant) on 06/10/24 at 0755      Medication Order Taking? Sig Documenting Provider Last Dose Status   allopurinol (Zyloprim) 300 mg tablet [id number]  Take 1 tablet (300 mg) by mouth once daily. Marj Weston MD  Active   cyanocobalamin (Vitamin B-12) 1,000 mcg tablet [id number] No Take 1 tablet (1,000 mcg) by mouth once daily. As directed Historical Provider, MD Taking Active   folic acid (Folvite) 1 mg tablet [id number] No Take 1 tablet (1 mg) by mouth once  daily. Historical Provider, MD Taking Active   furosemide (Lasix) 20 mg tablet [id number]  Take 1 tablet (20 mg) by mouth once daily. As directed Marj Weston MD  Active   lisinopril 10 mg tablet [id number]  Take 1 tablet (10 mg) by mouth once daily. Marj Weston MD  Active   LORazepam (Ativan) 0.5 mg tablet [id number] No Take 1 tablet (0.5 mg) by mouth 2 times a day as needed for anxiety. Marj Weston MD Taking Active   methocarbamol (Robaxin) 750 mg tablet [id number]  Take 1 tablet (750 mg) by mouth 3 times a day as needed for muscle spasms. Marj Weston MD  Active   pantoprazole (ProtoNix) 40 mg packet [id number] No Take 1 packet (40 mg) by mouth once daily. Historical Provider, MD Taking Active   potassium chloride CR 10 mEq ER tablet [id number]  Take 1 tablet (10 mEq) by mouth once daily. Marj Weston MD  Active   pyridoxine (Vitamin B-6) 50 mg tablet [id number] No Take 1 tablet (50 mg) by mouth once daily. Historical Provider, MD Taking Active   regadenoson (Lexiscan) injection 0.4 mg [id number]   Raj Garcia,   Active   warfarin (Coumadin) 4 mg tablet [id number] No TAKE 1 TABLET BY MOUTH DAILY FOR 5 DAYS AND 1/2 TABLET DAILY 2 DAYS A WEEK Historical Provider, MD Taking Active                    Allergies   Allergen Reactions    Erythromycin Unknown    Erythromycin-Benzoyl Peroxide Unknown    Iodinated Contrast Media Unknown    Povidone-Iodine Unknown       Social History     Socioeconomic History    Marital status:      Spouse name: Not on file    Number of children: Not on file    Years of education: Not on file    Highest education level: Not on file   Occupational History    Not on file   Tobacco Use    Smoking status: Former     Current packs/day: 0.50     Average packs/day: 0.5 packs/day for 25.0 years (12.5 ttl pk-yrs)     Types: Cigarettes    Smokeless tobacco: Never   Substance and Sexual Activity    Alcohol use: Not Currently    Drug use: Never     Sexual activity: Not on file   Other Topics Concern    Not on file   Social History Narrative    Not on file     Social Determinants of Health     Financial Resource Strain: Not on file   Food Insecurity: Not on file   Transportation Needs: Not on file   Physical Activity: Not on file   Stress: Not on file   Social Connections: Not on file   Intimate Partner Violence: Not on file   Housing Stability: Not on file       Past Surgical History:   Procedure Laterality Date    BLADDER SURGERY  12/18/2016    Bladder Surgery    CARDIAC CATHETERIZATION      CATARACT EXTRACTION  12/18/2016    Cataract Extraction    FL DECLOT BY THROMOBOLYTIC AGENT OF VAD OR CATHETER GUIDANCE Left     HYSTERECTOMY  12/18/2016    Hysterectomy    TONSILLECTOMY  12/18/2016    Tonsillectomy       Vascular duplex upper extremity venous left    Result Date: 10/5/2023  EXAMINATION: VENOUS ULTRASOUND OF THE LEFT UPPER EXTREMITY, 10/5/2023 11:02 am TECHNIQUE: Duplex ultrasound using B-mode/gray scaled imaging and Doppler spectral analysis and color flow was obtained of the deep venous structures of the left upper extremity. COMPARISON: None. HISTORY: ORDERING SYSTEM PROVIDED HISTORY: LUE pain, history of DVTs FINDINGS: There is normal flow and compressibility of the visualized venous structures. There is no evidence of echogenic thrombus. The veins demonstrate good compressibility with normal color flow study and spectral analysis.    No evidence of DVT in left upper extremity.    Electrocardiogram 12 Lead    Result Date: 10/3/2023  Normal sinus rhythm with sinus arrhythmia Left anterior fascicular block Poor R Wave Progression Abnormal ECG Confirmed by Raj Garcia ([id number]) on 10/3/2023 2:06:51 PM    Electrocardiogram 12 Lead    Result Date: 10/3/2023  Normal sinus rhythm with sinus arrhythmia Left axis deviation Poor R Wave Progression Abnormal ECG When compared with ECG of 28-SEP-2023 22:07, No significant change was found Confirmed by Radha  Raj ([id number]) on 10/3/2023 2:06:23 PM    CT head wo IV contrast    Result Date: 9/29/2023  Interpreted By:  SAVI MARTINEZ MD MRN: [id number] Patient Name: VELIA ALDRIDGE  STUDY: CT HEAD WO CONTRAST;  9/29/2023 12:51 pm  INDICATION: lightheaded, elevated INR .  COMPARISON: 08/05/2022 reporting no abnormality.  ACCESSION NUMBER(S): [id number]  ORDERING CLINICIAN: DANIELLE LOPEZ  TECHNIQUE: Noncontrast axial CT scan of head was performed. Angled reformats in brain and bone windows were generated. The images were reviewed in bone, brain, blood and soft tissue windows.  FINDINGS: No acute edema. No acute hemorrhage. No mass effect. Ventricular system is normal for age. No extra-axial fluid collections. Orbits are normal. Paranasal sinuses are clear.       No acute findings.    XR chest 1 view    Result Date: 9/29/2023  Interpreted By:  JONAH GAMEZ MD STUDY: Chest Radiograph;  9/29/2023, 10:22AM.  INDICATION: Chest pain.  COMPARISON: CT CAP 8/5/2022.  CXR 11/17/2021.  ACCESSION NUMBER(S): [id number]  ORDERING CLINICIAN: DANIELLE LOPEZ MD  TECHNIQUE:  Frontal chest was obtained at 11:04 hours.  FINDINGS:  CARDIOMEDIASTINAL SILHOUETTE: Cardiomediastinal silhouette is normal in size and configuration. There is a retrocardiac mass consistent with a hiatal hernia.  LUNGS: Lungs are clear.  ABDOMEN: No remarkable upper abdominal findings.  BONES: No acute osseous changes.      No evidence consolidating infiltrate or effusion.   Signed by Jonah Gamez MD    XR shoulder    Result Date: 9/28/2023  Interpreted By:  ELIZABETH JOHN MD MRN: [id number] Patient Name: VELIA ALDRIDGE  STUDY: WRIST; 2 VIEWS; SHOULDER, CMPLT, MIN 2 VIEWS; Left;  9/28/2023 10:37 pm  INDICATION: pain over distal radial styloid ; eval for fx. pain over proximal humeral head and scapula .  COMPARISON: None.  ACCESSION NUMBER(S): [id number]; [id number]  ORDERING CLINICIAN: ANASTACIO FRANZ  FINDINGS: Left wrist: Demineralization soft tissue swelling.  Distal radioulnar joint osteoarthritic change as well as some cystic change in the lunate suggesting abutment  No definite fracture is seen. Demineralization does limit sensitivity. Severe base of thumb osteoarthritis.  Left shoulder: Severe left glenohumeral osteoarthritis is seen. Probable joint effusion detected. No acute fracture or malalignment.      Severe left glenohumeral osteoarthritic change. Demineralization. Soft tissue swelling seen about the left wrist with degenerative change. No acute fracture. If concern for synovitis, consider MRI   MACRO: None    XR wrist    Result Date: 9/28/2023  Interpreted By:  ELIZABETH JOHN MD MRN: [id number] Patient Name: VELIA ALDRIDGE  STUDY: WRIST; 2 VIEWS; SHOULDER, CMPLT, MIN 2 VIEWS; Left;  9/28/2023 10:37 pm  INDICATION: pain over distal radial styloid ; eval for fx. pain over proximal humeral head and scapula .  COMPARISON: None.  ACCESSION NUMBER(S): [id number]; [id number]  ORDERING CLINICIAN: ANASTACIO FRANZ  FINDINGS: Left wrist: Demineralization soft tissue swelling. Distal radioulnar joint osteoarthritic change as well as some cystic change in the lunate suggesting abutment  No definite fracture is seen. Demineralization does limit sensitivity. Severe base of thumb osteoarthritis.  Left shoulder: Severe left glenohumeral osteoarthritis is seen. Probable joint effusion detected. No acute fracture or malalignment.      Severe left glenohumeral osteoarthritic change. Demineralization. Soft tissue swelling seen about the left wrist with degenerative change. No acute fracture. If concern for synovitis, consider MRI   MACRO: None          Scribe Attestation  By signing my name below, Prema AN Scribe   attest that this documentation has been prepared under the direction and in the presence of Erlin Aguilera MD.

## 2024-06-10 ENCOUNTER — OFFICE VISIT (OUTPATIENT)
Dept: ORTHOPEDIC SURGERY | Facility: CLINIC | Age: 79
End: 2024-06-10
Payer: MEDICARE

## 2024-06-10 DIAGNOSIS — M19.019 GLENOHUMERAL ARTHRITIS: Primary | ICD-10-CM

## 2024-06-10 PROCEDURE — 99214 OFFICE O/P EST MOD 30 MIN: CPT | Performed by: ORTHOPAEDIC SURGERY

## 2024-06-10 PROCEDURE — 2500000004 HC RX 250 GENERAL PHARMACY W/ HCPCS (ALT 636 FOR OP/ED): Performed by: ORTHOPAEDIC SURGERY

## 2024-06-10 PROCEDURE — 1159F MED LIST DOCD IN RCRD: CPT | Performed by: ORTHOPAEDIC SURGERY

## 2024-06-10 PROCEDURE — 2500000005 HC RX 250 GENERAL PHARMACY W/O HCPCS: Performed by: ORTHOPAEDIC SURGERY

## 2024-06-10 PROCEDURE — 20610 DRAIN/INJ JOINT/BURSA W/O US: CPT | Mod: LT | Performed by: ORTHOPAEDIC SURGERY

## 2024-06-10 PROCEDURE — 1036F TOBACCO NON-USER: CPT | Performed by: ORTHOPAEDIC SURGERY

## 2024-06-10 RX ORDER — TRIAMCINOLONE ACETONIDE 40 MG/ML
40 INJECTION, SUSPENSION INTRA-ARTICULAR; INTRAMUSCULAR
Status: COMPLETED | OUTPATIENT
Start: 2024-06-10 | End: 2024-06-10

## 2024-06-10 RX ORDER — METHYLPREDNISOLONE 4 MG/1
TABLET ORAL
Qty: 21 TABLET | Refills: 3 | Status: SHIPPED | OUTPATIENT
Start: 2024-06-10

## 2024-06-10 RX ORDER — LIDOCAINE HYDROCHLORIDE 10 MG/ML
8 INJECTION INFILTRATION; PERINEURAL
Status: COMPLETED | OUTPATIENT
Start: 2024-06-10 | End: 2024-06-10

## 2024-06-10 RX ORDER — CELECOXIB 200 MG/1
200 CAPSULE ORAL DAILY
Qty: 30 CAPSULE | Refills: 0 | Status: SHIPPED | OUTPATIENT
Start: 2024-06-10 | End: 2024-07-10

## 2024-06-10 RX ADMIN — LIDOCAINE HYDROCHLORIDE 8 ML: 10 INJECTION, SOLUTION INFILTRATION; PERINEURAL at 18:12

## 2024-06-10 RX ADMIN — TRIAMCINOLONE ACETONIDE 40 MG: 400 INJECTION, SUSPENSION INTRA-ARTICULAR; INTRAMUSCULAR at 18:12

## 2024-06-11 DIAGNOSIS — M19.90 ARTHRITIS: ICD-10-CM

## 2024-06-11 RX ORDER — ALLOPURINOL 300 MG/1
300 TABLET ORAL DAILY
Qty: 30 TABLET | Refills: 0 | Status: SHIPPED | OUTPATIENT
Start: 2024-06-11

## 2024-07-01 ENCOUNTER — APPOINTMENT (OUTPATIENT)
Dept: PRIMARY CARE | Facility: CLINIC | Age: 79
End: 2024-07-01
Payer: MEDICARE

## 2024-07-11 DIAGNOSIS — M19.90 ARTHRITIS: ICD-10-CM

## 2024-07-11 RX ORDER — ALLOPURINOL 300 MG/1
300 TABLET ORAL DAILY
Qty: 30 TABLET | Refills: 0 | Status: SHIPPED | OUTPATIENT
Start: 2024-07-11

## 2024-07-12 ENCOUNTER — APPOINTMENT (OUTPATIENT)
Dept: ORTHOPEDIC SURGERY | Facility: CLINIC | Age: 79
End: 2024-07-12
Payer: MEDICARE

## 2024-07-17 ENCOUNTER — APPOINTMENT (OUTPATIENT)
Dept: PRIMARY CARE | Facility: CLINIC | Age: 79
End: 2024-07-17
Payer: MEDICARE

## 2024-07-19 ENCOUNTER — APPOINTMENT (OUTPATIENT)
Dept: PRIMARY CARE | Facility: CLINIC | Age: 79
End: 2024-07-19
Payer: MEDICARE

## 2024-08-06 DIAGNOSIS — M19.90 ARTHRITIS: ICD-10-CM

## 2024-08-06 RX ORDER — ALLOPURINOL 300 MG/1
300 TABLET ORAL DAILY
Qty: 30 TABLET | Refills: 0 | OUTPATIENT
Start: 2024-08-06

## 2024-08-22 ENCOUNTER — APPOINTMENT (OUTPATIENT)
Dept: PRIMARY CARE | Facility: CLINIC | Age: 79
End: 2024-08-22
Payer: MEDICARE

## 2024-08-23 ENCOUNTER — APPOINTMENT (OUTPATIENT)
Dept: PRIMARY CARE | Facility: CLINIC | Age: 79
End: 2024-08-23
Payer: MEDICARE

## 2024-08-30 ENCOUNTER — HOSPITAL ENCOUNTER (EMERGENCY)
Facility: HOSPITAL | Age: 79
Discharge: HOME | End: 2024-08-30
Attending: STUDENT IN AN ORGANIZED HEALTH CARE EDUCATION/TRAINING PROGRAM
Payer: MEDICARE

## 2024-08-30 ENCOUNTER — APPOINTMENT (OUTPATIENT)
Dept: RADIOLOGY | Facility: HOSPITAL | Age: 79
End: 2024-08-30
Payer: MEDICARE

## 2024-08-30 VITALS
RESPIRATION RATE: 17 BRPM | WEIGHT: 173 LBS | DIASTOLIC BLOOD PRESSURE: 82 MMHG | OXYGEN SATURATION: 97 % | HEIGHT: 59 IN | BODY MASS INDEX: 34.88 KG/M2 | TEMPERATURE: 98.1 F | SYSTOLIC BLOOD PRESSURE: 166 MMHG | HEART RATE: 86 BPM

## 2024-08-30 DIAGNOSIS — M79.18 MUSCULOSKELETAL PAIN: ICD-10-CM
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
## 2024-08-30 LAB
ALBUMIN SERPL BCP-MCNC: 3.6 G/DL (ref 3.4–5)
ALP SERPL-CCNC: 62 U/L (ref 33–136)
ALT SERPL W P-5'-P-CCNC: 8 U/L (ref 7–45)
ANION GAP SERPL CALC-SCNC: 11 MMOL/L (ref 10–20)
AST SERPL W P-5'-P-CCNC: 16 U/L (ref 9–39)
BASOPHILS # BLD AUTO: 0.04 X10*3/UL (ref 0–0.1)
BASOPHILS NFR BLD AUTO: 0.6 %
BILIRUB SERPL-MCNC: 0.5 MG/DL (ref 0–1.2)
BUN SERPL-MCNC: 9 MG/DL (ref 6–23)
CALCIUM SERPL-MCNC: 8.4 MG/DL (ref 8.6–10.3)
CHLORIDE SERPL-SCNC: 108 MMOL/L (ref 98–107)
CO2 SERPL-SCNC: 27 MMOL/L (ref 21–32)
CREAT SERPL-MCNC: 0.68 MG/DL (ref 0.5–1.05)
EGFRCR SERPLBLD CKD-EPI 2021: 88 ML/MIN/1.73M*2
EOSINOPHIL # BLD AUTO: 0.1 X10*3/UL (ref 0–0.4)
EOSINOPHIL NFR BLD AUTO: 1.5 %
ERYTHROCYTE [DISTWIDTH] IN BLOOD BY AUTOMATED COUNT: 14.5 % (ref 11.5–14.5)
GLUCOSE SERPL-MCNC: 96 MG/DL (ref 74–99)
HCT VFR BLD AUTO: 44.1 % (ref 36–46)
HGB BLD-MCNC: 14.1 G/DL (ref 12–16)
IMM GRANULOCYTES # BLD AUTO: 0.03 X10*3/UL (ref 0–0.5)
IMM GRANULOCYTES NFR BLD AUTO: 0.4 % (ref 0–0.9)
INR PPP: 2.5 (ref 0.9–1.1)
LYMPHOCYTES # BLD AUTO: 1.53 X10*3/UL (ref 0.8–3)
LYMPHOCYTES NFR BLD AUTO: 22.7 %
MCH RBC QN AUTO: 30.5 PG (ref 26–34)
MCHC RBC AUTO-ENTMCNC: 32 G/DL (ref 32–36)
MCV RBC AUTO: 96 FL (ref 80–100)
MONOCYTES # BLD AUTO: 0.4 X10*3/UL (ref 0.05–0.8)
MONOCYTES NFR BLD AUTO: 5.9 %
NEUTROPHILS # BLD AUTO: 4.63 X10*3/UL (ref 1.6–5.5)
NEUTROPHILS NFR BLD AUTO: 68.9 %
NRBC BLD-RTO: 0 /100 WBCS (ref 0–0)
PLATELET # BLD AUTO: 218 X10*3/UL (ref 150–450)
POTASSIUM SERPL-SCNC: 3.9 MMOL/L (ref 3.5–5.3)
PROT SERPL-MCNC: 5.9 G/DL (ref 6.4–8.2)
PROTHROMBIN TIME: 28 SECONDS (ref 9.8–12.8)
RBC # BLD AUTO: 4.62 X10*6/UL (ref 4–5.2)
SODIUM SERPL-SCNC: 142 MMOL/L (ref 136–145)
WBC # BLD AUTO: 6.7 X10*3/UL (ref 4.4–11.3)

## 2024-08-30 PROCEDURE — 71250 CT THORAX DX C-: CPT | Performed by: RADIOLOGY

## 2024-08-30 PROCEDURE — G0390 TRAUMA RESPONS W/HOSP CRITI: HCPCS

## 2024-08-30 PROCEDURE — 99285 EMERGENCY DEPT VISIT HI MDM: CPT | Mod: 25

## 2024-08-30 PROCEDURE — 71250 CT THORAX DX C-: CPT

## 2024-08-30 PROCEDURE — 70450 CT HEAD/BRAIN W/O DYE: CPT | Performed by: RADIOLOGY

## 2024-08-30 PROCEDURE — 72125 CT NECK SPINE W/O DYE: CPT | Performed by: RADIOLOGY

## 2024-08-30 PROCEDURE — 72125 CT NECK SPINE W/O DYE: CPT

## 2024-08-30 PROCEDURE — 85610 PROTHROMBIN TIME: CPT | Performed by: STUDENT IN AN ORGANIZED HEALTH CARE EDUCATION/TRAINING PROGRAM

## 2024-08-30 PROCEDURE — 85025 COMPLETE CBC W/AUTO DIFF WBC: CPT | Performed by: STUDENT IN AN ORGANIZED HEALTH CARE EDUCATION/TRAINING PROGRAM

## 2024-08-30 PROCEDURE — 84075 ASSAY ALKALINE PHOSPHATASE: CPT | Performed by: STUDENT IN AN ORGANIZED HEALTH CARE EDUCATION/TRAINING PROGRAM

## 2024-08-30 PROCEDURE — 36415 COLL VENOUS BLD VENIPUNCTURE: CPT | Performed by: STUDENT IN AN ORGANIZED HEALTH CARE EDUCATION/TRAINING PROGRAM

## 2024-08-30 PROCEDURE — 70450 CT HEAD/BRAIN W/O DYE: CPT

## 2024-08-30 RX ORDER — ACETAMINOPHEN 325 MG/1
975 TABLET ORAL ONCE
Status: COMPLETED | OUTPATIENT
Start: 2024-08-30 | End: 2024-08-30

## 2024-08-30 ASSESSMENT — PAIN DESCRIPTION - DESCRIPTORS: DESCRIPTORS: ACHING

## 2024-08-30 ASSESSMENT — PAIN DESCRIPTION - PAIN TYPE: TYPE: ACUTE PAIN

## 2024-08-30 ASSESSMENT — COLUMBIA-SUICIDE SEVERITY RATING SCALE - C-SSRS
2. HAVE YOU ACTUALLY HAD ANY THOUGHTS OF KILLING YOURSELF?: NO
1. IN THE PAST MONTH, HAVE YOU WISHED YOU WERE DEAD OR WISHED YOU COULD GO TO SLEEP AND NOT WAKE UP?: NO
6. HAVE YOU EVER DONE ANYTHING, STARTED TO DO ANYTHING, OR PREPARED TO DO ANYTHING TO END YOUR LIFE?: NO

## 2024-08-30 ASSESSMENT — PAIN DESCRIPTION - ORIENTATION: ORIENTATION: RIGHT

## 2024-08-30 ASSESSMENT — PAIN DESCRIPTION - LOCATION: LOCATION: RIB CAGE

## 2024-08-30 ASSESSMENT — PAIN SCALES - GENERAL: PAINLEVEL_OUTOF10: 6

## 2024-08-30 ASSESSMENT — PAIN - FUNCTIONAL ASSESSMENT: PAIN_FUNCTIONAL_ASSESSMENT: 0-10

## 2024-08-30 NOTE — ED PROVIDER NOTES
HPI   Chief Complaint   Patient presents with    Trauma       Patient is an 79-year-old female presenting to the emergency department for complaints of fall.  Patient states she tripped over her cat and fell into a lift chair hitting her head against the wall.  She denies any loss of consciousness.  Patient does endorse taking Coumadin for history of blood clots.  Past medical history includes anxiety, arthritis, depression, dizziness, hypertension, factor V Leyden disease, GERD, gout, falls, DVTs.  Patient had to Endorses right chest wall.  But denies any head neck or back pain.  Patient states that she had no prodromal symptoms such as dizziness, lightheadedness, chest pain, difficulty breathing, palpitations before the fall.  Patient denies any fevers, chills, chest pain, difficulty breathing, productive cough, abdominal pain, nausea, vomiting, diarrhea, syncope.      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    Drug or chemical induced diabetes mellitus, controlled, with hyperglycemia    Gout, unspecified 05/04/2022    Gout    Personal history of other specified conditions 12/18/2016    History of fatigue    Personal history of other venous thrombosis and embolism 04/01/2019    History of deep venous thrombosis    Personal history of other venous thrombosis and embolism 12/18/2016    History of deep venous thrombosis    Personal history of urinary (tract) infections 12/09/2016    History of UTI     Past Surgical History:   Procedure Laterality Date    BLADDER SURGERY  12/18/2016    Bladder  Surgery    CARDIAC CATHETERIZATION      CATARACT EXTRACTION  12/18/2016    Cataract Extraction    FL DECLOT BY THROMOBOLYTIC AGENT OF VAD OR CATHETER GUIDANCE Left     HYSTERECTOMY  12/18/2016    Hysterectomy    TONSILLECTOMY  12/18/2016    Tonsillectomy     Family History   Problem Relation Name Age of Onset    Coronary artery disease Mother      Breast cancer Mother      Rheum arthritis Mother      Colon cancer Son      Colon cancer Maternal Grandmother       Social History     Tobacco Use    Smoking status: Former     Current packs/day: 0.50     Average packs/day: 0.5 packs/day for 25.0 years (12.5 ttl pk-yrs)     Types: Cigarettes    Smokeless tobacco: Never   Substance Use Topics    Alcohol use: Not Currently    Drug use: Never       Physical Exam   ED Triage Vitals   Temp Pulse Resp BP   -- -- -- --      SpO2 Temp src Heart Rate Source Patient Position   -- -- -- --      BP Location FiO2 (%)     -- --       Physical Exam  Vitals and nursing note reviewed.   Constitutional:       General: She is not in acute distress.     Appearance: Normal appearance. She is not ill-appearing, toxic-appearing or diaphoretic.   HENT:      Head: Normocephalic and atraumatic.      Nose: Nose normal.      Mouth/Throat:      Mouth: Mucous membranes are moist.      Pharynx: No oropharyngeal exudate or posterior oropharyngeal erythema.   Eyes:      General: No scleral icterus.     Extraocular Movements: Extraocular movements intact.      Pupils: Pupils are equal, round, and reactive to light.   Cardiovascular:      Rate and Rhythm: Normal rate and regular rhythm.      Pulses: Normal pulses.      Heart sounds: Normal heart sounds. No murmur heard.     No friction rub. No gallop.   Pulmonary:      Effort: Pulmonary effort is normal. No respiratory distress.      Breath sounds: Normal breath sounds. No stridor. No wheezing, rhonchi or rales.   Chest:      Chest wall: No tenderness.   Abdominal:      General: Abdomen is flat. There is no  distension.      Palpations: Abdomen is soft. There is no mass.      Tenderness: There is no abdominal tenderness. There is no guarding.      Hernia: No hernia is present.   Musculoskeletal:         General: Tenderness present. No swelling, deformity or signs of injury. Normal range of motion.      Cervical back: Normal range of motion and neck supple. No rigidity.   Skin:     General: Skin is warm and dry.      Capillary Refill: Capillary refill takes less than 2 seconds.      Coloration: Skin is not jaundiced or pale.      Findings: No bruising, erythema, lesion or rash.   Neurological:      General: No focal deficit present.      Mental Status: She is alert and oriented to person, place, and time. Mental status is at baseline.   Psychiatric:         Mood and Affect: Mood normal.         Behavior: Behavior normal.           ED Course & MDM   Diagnoses as of 08/30/24 0942   Fall, initial encounter   Musculoskeletal pain                 No data recorded     M[place]ock Coma Scale Score: 15 (08/30/24 0721 : Soledad Freeman RN)                           Medical Decision Making  Patient is an 79-year-old female presenting to the emergency department for complaints of mechanical fall.  Patient states she tripped over her cat denies any prodromal symptoms.  Patient endorsing right chest wall pain from falling into her lift chair.  Patient states that she did hit the back of her head on the wall but denies any loss consciousness.  Patient does take Coumadin and states her last INR was therapeutic.  Will obtain CT scans to evaluate for intracranial bleed and osseous fracture.  Labs ordered including INR.    CT head negative for acute intracranial abnormalities.  CT C-spine with degenerative changes and some mild central canal stenosis and left foraminal stenosis at C3-C4.  Patient is not having any difficulty breathing or extremity issues.  CT chest without apparent fractures.  Patient was given Tylenol for her pain.  Incentive  spirometer was ordered and patient's imaging results were discussed.  Lab results were also discussed and INR is therapeutic at 2.5.  Patient was advised of return precautions and advised follow-up with her doctors.  All questions were answered.  Patient was appreciative care and agreeable to discharge.    Amount and/or Complexity of Data Reviewed  Labs: ordered. Decision-making details documented in ED Course.  Radiology: ordered. Decision-making details documented in ED Course.      Labs Reviewed   COMPREHENSIVE METABOLIC PANEL - Abnormal       Result Value    Glucose 96      Sodium 142      Potassium 3.9      Chloride 108 (*)     Bicarbonate 27      Anion Gap 11      Urea Nitrogen 9      Creatinine 0.68      eGFR 88      Calcium 8.4 (*)     Albumin 3.6      Alkaline Phosphatase 62      Total Protein 5.9 (*)     AST 16      Bilirubin, Total 0.5      ALT 8     PROTIME-INR - Abnormal    Protime 28.0 (*)     INR 2.5 (*)    CBC WITH AUTO DIFFERENTIAL    WBC 6.7      nRBC 0.0      RBC 4.62      Hemoglobin 14.1      Hematocrit 44.1      MCV 96      MCH 30.5      MCHC 32.0      RDW 14.5      Platelets 218      Neutrophils % 68.9      Immature Granulocytes %, Automated 0.4      Lymphocytes % 22.7      Monocytes % 5.9      Eosinophils % 1.5      Basophils % 0.6      Neutrophils Absolute 4.63      Immature Granulocytes Absolute, Automated 0.03      Lymphocytes Absolute 1.53      Monocytes Absolute 0.40      Eosinophils Absolute 0.10      Basophils Absolute 0.04       CT chest wo IV contrast   Final Result   Extensive vascular calcifications of coronary arteries and of the   aorta, which remains right sided in position. No focal aneurysm.   Moderate size hiatal hernia. Persistent by bilateral minimal   atelectatic changes.        Signed by: Vladimir Del Rio 8/30/2024 8:36 AM   Dictation workstation:   [id number]      CT head wo IV contrast   Final Result   No acute intracranial process or interval change when compared with    prior study.        MACRO:   None             Signed by: Josephine Mayer 8/30/2024 8:08 AM   Dictation workstation:   [id number]      CT cervical spine wo IV contrast   Final Result   No evidence for an acute fracture or subluxation of the cervical   spine.        Degenerative disc disease at C5-6 with bilateral foraminal stenosis   and mild central canal stenosis at this level. There is left   foraminal stenosis at the C3-4 level.        MACRO:   None        Signed by: Josephine Mayer 8/30/2024 8:19 AM   Dictation workstation:   [id number]            Procedure  Procedures     Benitez Singh DO  08/30/24 0942

## 2024-08-30 NOTE — DISCHARGE INSTRUCTIONS
Please follow up with your Primary Care Provider (PCP) within the next 2-3 days regarding your ED visit.  Seek immediate medical attention if you develop: worsening chest pain, new chest pain, nausea, vomiting, weakness, numbness, tingling, excessive sweating, shortness of breath, difficulty breathing, loss of motion in your arms or legs, or any new or worsening symptoms.  These documents have a lot of useful information! Please read them, so you know what to expect, what you can do for yourself at home, and also to know concerning signs warrant a return to the Emergency Department for additional evaluation.  You are welcome back any time. Thank you for entrusting your care to us, I hope we made your visit as pleasant as possible. Wishing you well!    Dr. Singh

## 2024-09-09 ENCOUNTER — APPOINTMENT (OUTPATIENT)
Dept: PRIMARY CARE | Facility: CLINIC | Age: 79
End: 2024-09-09
Payer: MEDICARE

## 2024-09-09 VITALS
BODY MASS INDEX: 36.15 KG/M2 | HEART RATE: 76 BPM | SYSTOLIC BLOOD PRESSURE: 128 MMHG | WEIGHT: 179 LBS | DIASTOLIC BLOOD PRESSURE: 84 MMHG

## 2024-09-09 DIAGNOSIS — S29.9XXA TRAUMATIC INJURY OF RIB: Primary | ICD-10-CM

## 2024-09-09 DIAGNOSIS — R60.0 PEDAL EDEMA: ICD-10-CM

## 2024-09-09 DIAGNOSIS — R07.81 RIB PAIN ON RIGHT SIDE: ICD-10-CM

## 2024-09-09 DIAGNOSIS — M19.90 ARTHRITIS: ICD-10-CM

## 2024-09-09 DIAGNOSIS — D68.51 FACTOR V LEIDEN (MULTI): ICD-10-CM

## 2024-09-09 PROCEDURE — 3079F DIAST BP 80-89 MM HG: CPT | Performed by: INTERNAL MEDICINE

## 2024-09-09 PROCEDURE — 1160F RVW MEDS BY RX/DR IN RCRD: CPT | Performed by: INTERNAL MEDICINE

## 2024-09-09 PROCEDURE — 1159F MED LIST DOCD IN RCRD: CPT | Performed by: INTERNAL MEDICINE

## 2024-09-09 PROCEDURE — 99214 OFFICE O/P EST MOD 30 MIN: CPT | Performed by: INTERNAL MEDICINE

## 2024-09-09 PROCEDURE — 3074F SYST BP LT 130 MM HG: CPT | Performed by: INTERNAL MEDICINE

## 2024-09-09 PROCEDURE — 1036F TOBACCO NON-USER: CPT | Performed by: INTERNAL MEDICINE

## 2024-09-09 RX ORDER — METHOCARBAMOL 750 MG/1
750 TABLET, FILM COATED ORAL 3 TIMES DAILY PRN
Qty: 60 TABLET | Refills: 1 | Status: SHIPPED | OUTPATIENT
Start: 2024-09-09

## 2024-09-09 RX ORDER — FUROSEMIDE 20 MG/1
20 TABLET ORAL DAILY
Qty: 90 TABLET | Refills: 1 | Status: SHIPPED | OUTPATIENT
Start: 2024-09-09

## 2024-09-09 RX ORDER — POTASSIUM CHLORIDE 750 MG/1
10 TABLET, FILM COATED, EXTENDED RELEASE ORAL DAILY
Qty: 60 TABLET | Refills: 1 | Status: SHIPPED | OUTPATIENT
Start: 2024-09-09

## 2024-09-09 ASSESSMENT — PATIENT HEALTH QUESTIONNAIRE - PHQ9
2. FEELING DOWN, DEPRESSED OR HOPELESS: NOT AT ALL
SUM OF ALL RESPONSES TO PHQ9 QUESTIONS 1 & 2: 0
1. LITTLE INTEREST OR PLEASURE IN DOING THINGS: NOT AT ALL

## 2024-09-09 ASSESSMENT — ENCOUNTER SYMPTOMS
CONSTITUTIONAL NEGATIVE: 1
GASTROINTESTINAL NEGATIVE: 1
EYES NEGATIVE: 1
CARDIOVASCULAR NEGATIVE: 1

## 2024-09-09 NOTE — PROGRESS NOTES
Subjective   Patient ID: Keyana Barcenas is a 79 y.o. female who presents for Follow-up (Pt here for ED follow up S/P fall hit head Pt  in rib area).    HPI patient here for acute visit she had fallen and hit her rib cage twice against her 's chair she has bruising in the ribs she went to ER she had also had some head injury CAT scans were negative her CAT scan of chest was negative for fractures I strongly suspect that she has rib fracture based on the clinical exam or at least contusion of the rib she also has some right upper quadrant pain.    Review of Systems   Constitutional: Negative.    HENT: Negative.     Eyes: Negative.    Cardiovascular: Negative.    Gastrointestinal: Negative.    Genitourinary: Negative.    All other systems reviewed and are negative.      Objective   /84   Pulse 76   Wt 81.2 kg (179 lb)   BMI 36.15 kg/m²     Physical Exam  Vitals reviewed.   Constitutional:       Appearance: Normal appearance.      Comments: Uncomfortable, in moderate pain   HENT:      Head: Normocephalic.   Eyes:      Pupils: Pupils are equal, round, and reactive to light.   Cardiovascular:      Rate and Rhythm: Normal rate and regular rhythm.      Pulses: Normal pulses.      Heart sounds: Normal heart sounds.   Pulmonary:      Effort: Pulmonary effort is normal.      Breath sounds: Normal breath sounds.   Neurological:      General: No focal deficit present.      Mental Status: She is alert and oriented to person, place, and time.   Psychiatric:         Mood and Affect: Mood normal.         Behavior: Behavior normal.         Thought Content: Thought content normal.         Assessment/Plan   Problem List Items Addressed This Visit             ICD-10-CM    Arthritis M19.90    Relevant Medications    methocarbamol (Robaxin) 750 mg tablet    Factor V Leiden (Multi) D68.51     Other Visit Diagnoses         Codes    Traumatic injury of rib    -  Primary S29.9XXA    Relevant Orders     The patient calls to follow up on her question. Please call her back with an update.   CT abdomen pelvis wo IV contrast    Rib pain on right side     R07.81          Clinically it is very high likelihood of rib fracture she could have injury to the liver so we will have to do a CAT scan stat.  She will take methocarbamol and Tylenol for now.  Pain has slightly improved but since injury which still she finds very difficult to even change position.  Denies any chest pain shortness of breath.  Her warfarin for factor V Leiden genetic mutation will be continued.  I reviewed her ER visit CAT scans and lab investigations.  Complexity of medical decision making moderate.        1 person assist

## 2024-09-11 ENCOUNTER — HOSPITAL ENCOUNTER (OUTPATIENT)
Dept: RADIOLOGY | Facility: HOSPITAL | Age: 79
Discharge: HOME | End: 2024-09-11
Payer: MEDICARE

## 2024-09-11 DIAGNOSIS — S29.9XXA TRAUMATIC INJURY OF RIB: ICD-10-CM

## 2024-09-11 PROCEDURE — 74176 CT ABD & PELVIS W/O CONTRAST: CPT | Performed by: RADIOLOGY

## 2024-09-11 PROCEDURE — 74176 CT ABD & PELVIS W/O CONTRAST: CPT

## 2024-09-13 ENCOUNTER — APPOINTMENT (OUTPATIENT)
Dept: PRIMARY CARE | Facility: CLINIC | Age: 79
End: 2024-09-13
Payer: MEDICARE

## 2024-09-16 NOTE — TELEPHONE ENCOUNTER
Order for ECHO placed in chart and sent to Dr. Raj Garcia, DO for sign off.    Performed By: Patricia Baldwin MA Detail Level: None Urine Pregnancy Test Result: negative

## 2024-10-04 ENCOUNTER — APPOINTMENT (OUTPATIENT)
Dept: PRIMARY CARE | Facility: CLINIC | Age: 79
End: 2024-10-04
Payer: MEDICARE

## 2024-10-09 ENCOUNTER — APPOINTMENT (OUTPATIENT)
Dept: PRIMARY CARE | Facility: CLINIC | Age: 79
End: 2024-10-09
Payer: MEDICARE

## 2024-10-09 VITALS
TEMPERATURE: 97.9 F | HEART RATE: 64 BPM | WEIGHT: 178 LBS | SYSTOLIC BLOOD PRESSURE: 130 MMHG | DIASTOLIC BLOOD PRESSURE: 76 MMHG | BODY MASS INDEX: 35.95 KG/M2

## 2024-10-09 DIAGNOSIS — S22.41XD CLOSED FRACTURE OF MULTIPLE RIBS OF RIGHT SIDE WITH ROUTINE HEALING: Primary | ICD-10-CM

## 2024-10-09 DIAGNOSIS — D68.2 HEREDITARY DEFICIENCY OF OTHER CLOTTING FACTORS: ICD-10-CM

## 2024-10-09 DIAGNOSIS — M19.90 ARTHRITIS: ICD-10-CM

## 2024-10-09 DIAGNOSIS — E79.0 HYPERURICEMIA: ICD-10-CM

## 2024-10-09 PROCEDURE — 1158F ADVNC CARE PLAN TLK DOCD: CPT | Performed by: INTERNAL MEDICINE

## 2024-10-09 PROCEDURE — 1159F MED LIST DOCD IN RCRD: CPT | Performed by: INTERNAL MEDICINE

## 2024-10-09 PROCEDURE — 3075F SYST BP GE 130 - 139MM HG: CPT | Performed by: INTERNAL MEDICINE

## 2024-10-09 PROCEDURE — 1123F ACP DISCUSS/DSCN MKR DOCD: CPT | Performed by: INTERNAL MEDICINE

## 2024-10-09 PROCEDURE — 3078F DIAST BP <80 MM HG: CPT | Performed by: INTERNAL MEDICINE

## 2024-10-09 PROCEDURE — 1036F TOBACCO NON-USER: CPT | Performed by: INTERNAL MEDICINE

## 2024-10-09 PROCEDURE — 1160F RVW MEDS BY RX/DR IN RCRD: CPT | Performed by: INTERNAL MEDICINE

## 2024-10-09 PROCEDURE — 99213 OFFICE O/P EST LOW 20 MIN: CPT | Performed by: INTERNAL MEDICINE

## 2024-10-09 ASSESSMENT — ENCOUNTER SYMPTOMS
PSYCHIATRIC NEGATIVE: 1
MUSCULOSKELETAL NEGATIVE: 1
GASTROINTESTINAL NEGATIVE: 1
CARDIOVASCULAR NEGATIVE: 1
NEUROLOGICAL NEGATIVE: 1
ENDOCRINE NEGATIVE: 1

## 2024-10-09 ASSESSMENT — PATIENT HEALTH QUESTIONNAIRE - PHQ9
1. LITTLE INTEREST OR PLEASURE IN DOING THINGS: NOT AT ALL
SUM OF ALL RESPONSES TO PHQ9 QUESTIONS 1 & 2: 0
2. FEELING DOWN, DEPRESSED OR HOPELESS: NOT AT ALL

## 2024-10-09 NOTE — PROGRESS NOTES
Subjective   Patient ID: Keyana Barcenas is a 79 y.o. female who presents for Follow-up (Pt here for follow up rib fracture).    HPI   Patient here for follow-up of rib fractures finally her pain subsiding and she feels much better she takes Tylenol as needed we also advised her that she could use topical analgesic creams like Bengay  Review of Systems   Cardiovascular: Negative.    Gastrointestinal: Negative.    Endocrine: Negative.    Genitourinary: Negative.    Musculoskeletal: Negative.    Skin: Negative.    Neurological: Negative.    Hematological:         Factor V  gene mutation   Psychiatric/Behavioral: Negative.     All other systems reviewed and are negative.      Objective   /76   Pulse 64   Temp 36.6 °C (97.9 °F) (Temporal)   Wt 80.7 kg (178 lb)   BMI 35.95 kg/m²     Physical Exam  Vitals reviewed.   Constitutional:       Appearance: Normal appearance.   HENT:      Head: Normocephalic.   Neck:      Vascular: No carotid bruit.   Cardiovascular:      Rate and Rhythm: Normal rate and regular rhythm.   Pulmonary:      Effort: Pulmonary effort is normal.      Breath sounds: Normal breath sounds.   Musculoskeletal:      Right lower leg: No edema.      Left lower leg: No edema.   Neurological:      General: No focal deficit present.      Mental Status: She is alert and oriented to person, place, and time.         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  Continue with Lasix for lower extremity edema continue lisinopril for hypertension continue Robaxin as needed for rib pain.

## 2024-11-08 DIAGNOSIS — R60.0 PEDAL EDEMA: ICD-10-CM

## 2024-11-08 RX ORDER — FUROSEMIDE 20 MG/1
20 TABLET ORAL DAILY
Qty: 90 TABLET | Refills: 1 | Status: SHIPPED | OUTPATIENT
Start: 2024-11-08

## 2025-01-13 DIAGNOSIS — R60.0 PEDAL EDEMA: ICD-10-CM

## 2025-01-13 RX ORDER — POTASSIUM CHLORIDE 750 MG/1
10 TABLET, FILM COATED, EXTENDED RELEASE ORAL DAILY
Qty: 60 TABLET | Refills: 1 | Status: SHIPPED | OUTPATIENT
Start: 2025-01-13

## 2025-03-10 ENCOUNTER — APPOINTMENT (OUTPATIENT)
Dept: PRIMARY CARE | Facility: CLINIC | Age: 80
End: 2025-03-10
Payer: MEDICARE

## 2025-03-12 DIAGNOSIS — R60.0 PEDAL EDEMA: ICD-10-CM

## 2025-03-12 RX ORDER — FUROSEMIDE 20 MG/1
20 TABLET ORAL DAILY
Qty: 90 TABLET | Refills: 0 | Status: SHIPPED | OUTPATIENT
Start: 2025-03-12

## 2025-03-21 DIAGNOSIS — R60.0 PEDAL EDEMA: ICD-10-CM

## 2025-03-21 RX ORDER — POTASSIUM CHLORIDE 750 MG/1
10 TABLET, FILM COATED, EXTENDED RELEASE ORAL DAILY
Qty: 60 TABLET | Refills: 2 | Status: SHIPPED | OUTPATIENT
Start: 2025-03-21

## 2025-04-18 ENCOUNTER — APPOINTMENT (OUTPATIENT)
Dept: PRIMARY CARE | Facility: CLINIC | Age: 80
End: 2025-04-18
Payer: MEDICARE

## 2025-04-18 VITALS
BODY MASS INDEX: 35.48 KG/M2 | HEIGHT: 59 IN | HEART RATE: 68 BPM | SYSTOLIC BLOOD PRESSURE: 128 MMHG | TEMPERATURE: 97.5 F | DIASTOLIC BLOOD PRESSURE: 74 MMHG | WEIGHT: 176 LBS

## 2025-04-18 DIAGNOSIS — D68.51 FACTOR V LEIDEN (MULTI): ICD-10-CM

## 2025-04-18 DIAGNOSIS — R60.9 EDEMA, UNSPECIFIED TYPE: ICD-10-CM

## 2025-04-18 DIAGNOSIS — I10 ESSENTIAL HYPERTENSION: ICD-10-CM

## 2025-04-18 DIAGNOSIS — M19.90 ARTHRITIS: ICD-10-CM

## 2025-04-18 DIAGNOSIS — Z12.31 ENCOUNTER FOR SCREENING MAMMOGRAM FOR BREAST CANCER: ICD-10-CM

## 2025-04-18 DIAGNOSIS — E79.0 HYPERURICEMIA: ICD-10-CM

## 2025-04-18 DIAGNOSIS — Z00.00 ROUTINE GENERAL MEDICAL EXAMINATION AT HEALTH CARE FACILITY: Primary | ICD-10-CM

## 2025-04-18 DIAGNOSIS — E78.2 MIXED HYPERLIPIDEMIA: ICD-10-CM

## 2025-04-18 DIAGNOSIS — E66.812 CLASS 2 SEVERE OBESITY DUE TO EXCESS CALORIES WITH SERIOUS COMORBIDITY AND BODY MASS INDEX (BMI) OF 35.0 TO 35.9 IN ADULT: ICD-10-CM

## 2025-04-18 DIAGNOSIS — D68.2 HEREDITARY DEFICIENCY OF OTHER CLOTTING FACTORS: ICD-10-CM

## 2025-04-18 DIAGNOSIS — E66.01 CLASS 2 SEVERE OBESITY DUE TO EXCESS CALORIES WITH SERIOUS COMORBIDITY AND BODY MASS INDEX (BMI) OF 35.0 TO 35.9 IN ADULT: ICD-10-CM

## 2025-04-18 DIAGNOSIS — Z00.00 MEDICARE ANNUAL WELLNESS VISIT, SUBSEQUENT: ICD-10-CM

## 2025-04-18 PROBLEM — U07.1 DISEASE DUE TO SEVERE ACUTE RESPIRATORY SYNDROME CORONAVIRUS 2 (SARS-COV-2): Status: RESOLVED | Noted: 2023-02-03 | Resolved: 2025-04-18

## 2025-04-18 PROCEDURE — 99397 PER PM REEVAL EST PAT 65+ YR: CPT | Performed by: INTERNAL MEDICINE

## 2025-04-18 PROCEDURE — 1036F TOBACCO NON-USER: CPT | Performed by: INTERNAL MEDICINE

## 2025-04-18 PROCEDURE — G0439 PPPS, SUBSEQ VISIT: HCPCS | Performed by: INTERNAL MEDICINE

## 2025-04-18 PROCEDURE — 1160F RVW MEDS BY RX/DR IN RCRD: CPT | Performed by: INTERNAL MEDICINE

## 2025-04-18 PROCEDURE — 1123F ACP DISCUSS/DSCN MKR DOCD: CPT | Performed by: INTERNAL MEDICINE

## 2025-04-18 PROCEDURE — 3078F DIAST BP <80 MM HG: CPT | Performed by: INTERNAL MEDICINE

## 2025-04-18 PROCEDURE — 3074F SYST BP LT 130 MM HG: CPT | Performed by: INTERNAL MEDICINE

## 2025-04-18 PROCEDURE — 1159F MED LIST DOCD IN RCRD: CPT | Performed by: INTERNAL MEDICINE

## 2025-04-18 RX ORDER — ALLOPURINOL 300 MG/1
300 TABLET ORAL DAILY
Qty: 90 TABLET | Refills: 1 | Status: SHIPPED | OUTPATIENT
Start: 2025-04-18 | End: 2025-10-15

## 2025-04-18 RX ORDER — FUROSEMIDE 20 MG/1
20 TABLET ORAL DAILY
Qty: 90 TABLET | Refills: 1 | Status: SHIPPED | OUTPATIENT
Start: 2025-04-18 | End: 2025-10-15

## 2025-04-18 ASSESSMENT — PATIENT HEALTH QUESTIONNAIRE - PHQ9
1. LITTLE INTEREST OR PLEASURE IN DOING THINGS: NOT AT ALL
2. FEELING DOWN, DEPRESSED OR HOPELESS: NOT AT ALL
SUM OF ALL RESPONSES TO PHQ9 QUESTIONS 1 & 2: 0

## 2025-04-18 ASSESSMENT — ENCOUNTER SYMPTOMS
GASTROINTESTINAL NEGATIVE: 1
RESPIRATORY NEGATIVE: 1
CONSTITUTIONAL NEGATIVE: 1
EYES NEGATIVE: 1
NEUROLOGICAL NEGATIVE: 1
ENDOCRINE NEGATIVE: 1
CARDIOVASCULAR NEGATIVE: 1
HEMATOLOGIC/LYMPHATIC NEGATIVE: 1

## 2025-04-18 NOTE — PROGRESS NOTES
"Subjective   Reason for Visit: Keyana Barcenas is an 80 y.o. female here for a Medicare Wellness visit.               Patient here for Medicare wellness she has a history of factor V Leiden genetic mutation she is on warfarin.  She gets CBC checked every 6 months with hematology.  She is here for medications refills as well.      Maternal Grand father  of DVT/PE  Patient Care Team:  Marj Weston MD as PCP - General  Marj Weston MD as PCP - [employer] PCP  Francisco Paulino MD as Referring Physician (Hematology and Oncology)     Review of Systems   Constitutional: Negative.    HENT: Negative.     Eyes: Negative.    Respiratory: Negative.     Cardiovascular: Negative.    Gastrointestinal: Negative.    Endocrine: Negative.    Genitourinary: Negative.    Neurological: Negative.    Hematological: Negative.    All other systems reviewed and are negative.      Objective   Vitals:  /74   Pulse 68   Temp 36.4 °C (97.5 °F) (Temporal)   Ht 1.499 m (4' 11\")   Wt 79.8 kg (176 lb)   BMI 35.55 kg/m²       Physical Exam  Vitals reviewed.   Constitutional:       Appearance: Normal appearance.   HENT:      Head: Normocephalic and atraumatic.   Eyes:      Conjunctiva/sclera: Conjunctivae normal.   Neck:      Vascular: No carotid bruit.   Cardiovascular:      Rate and Rhythm: Normal rate and regular rhythm.      Pulses: Normal pulses.      Heart sounds: Normal heart sounds.   Pulmonary:      Effort: Pulmonary effort is normal.      Breath sounds: Normal breath sounds.   Abdominal:      Palpations: Abdomen is soft. There is no mass.      Tenderness: There is no abdominal tenderness.   Musculoskeletal:      Right lower leg: No edema.      Left lower leg: No edema.   Neurological:      General: No focal deficit present.      Mental Status: She is alert and oriented to person, place, and time. Mental status is at baseline.      Cranial Nerves: No cranial nerve deficit. "   Psychiatric:         Mood and Affect: Mood normal.         Behavior: Behavior normal.       Assessment & Plan  Arthritis    Orders:    allopurinol (Zyloprim) 300 mg tablet; Take 1 tablet (300 mg) by mouth once daily.    Edema, unspecified type    Orders:    furosemide (Lasix) 20 mg tablet; Take 1 tablet (20 mg) by mouth once daily.    Essential hypertension    Orders:    furosemide (Lasix) 20 mg tablet; Take 1 tablet (20 mg) by mouth once daily.    Class 2 severe obesity due to excess calories with serious comorbidity and body mass index (BMI) of 35.0 to 35.9 in adult         Routine general medical examination at health care facility         Encounter for screening mammogram for breast cancer    Orders:    BI mammo bilateral screening tomosynthesis; Future    Mixed hyperlipidemia    Orders:    Comprehensive Metabolic Panel; Future    Lipid Panel; Future    Cholesterol, LDL Direct; Future    Hyperuricemia    Orders:    Uric acid; Future    Factor V Leiden (Multi)         Hereditary deficiency of other clotting factors         Medicare annual wellness visit, subsequent  Medication reviewed.  She will continue Lasix for lower extremity edema she is on allopurinol for hyperuricemia and gout she will continue her lisinopril for hypertension.  Follow-up with us in 6 months.                  I have reviewed and confirmed nurses' notes...

## 2025-04-18 NOTE — ASSESSMENT & PLAN NOTE
Orders:    Uric acid; Future    
  Orders:    allopurinol (Zyloprim) 300 mg tablet; Take 1 tablet (300 mg) by mouth once daily.    
  Orders:    furosemide (Lasix) 20 mg tablet; Take 1 tablet (20 mg) by mouth once daily.    
  Orders:    furosemide (Lasix) 20 mg tablet; Take 1 tablet (20 mg) by mouth once daily.    
Medication reviewed.  She will continue Lasix for lower extremity edema she is on allopurinol for hyperuricemia and gout she will continue her lisinopril for hypertension.  Follow-up with us in 6 months.       
Moderna dose 1 and 2

## 2025-05-18 DIAGNOSIS — E79.0 HYPERURICEMIA: ICD-10-CM

## 2025-05-18 DIAGNOSIS — E78.2 MIXED HYPERLIPIDEMIA: ICD-10-CM

## 2025-05-29 ENCOUNTER — HOSPITAL ENCOUNTER (OUTPATIENT)
Dept: RADIOLOGY | Facility: HOSPITAL | Age: 80
Discharge: HOME | End: 2025-05-29
Payer: MEDICARE

## 2025-05-29 ENCOUNTER — OFFICE VISIT (OUTPATIENT)
Dept: PRIMARY CARE | Facility: CLINIC | Age: 80
End: 2025-05-29
Payer: MEDICARE

## 2025-05-29 VITALS
RESPIRATION RATE: 16 BRPM | DIASTOLIC BLOOD PRESSURE: 76 MMHG | SYSTOLIC BLOOD PRESSURE: 132 MMHG | HEART RATE: 74 BPM | WEIGHT: 173 LBS | HEIGHT: 59 IN | BODY MASS INDEX: 34.88 KG/M2

## 2025-05-29 DIAGNOSIS — R05.1 ACUTE COUGH: ICD-10-CM

## 2025-05-29 DIAGNOSIS — J20.9 ACUTE BRONCHITIS, UNSPECIFIED ORGANISM: ICD-10-CM

## 2025-05-29 DIAGNOSIS — D68.2 HEREDITARY DEFICIENCY OF OTHER CLOTTING FACTORS: ICD-10-CM

## 2025-05-29 DIAGNOSIS — R05.1 ACUTE COUGH: Primary | ICD-10-CM

## 2025-05-29 PROCEDURE — 3078F DIAST BP <80 MM HG: CPT | Performed by: INTERNAL MEDICINE

## 2025-05-29 PROCEDURE — 3075F SYST BP GE 130 - 139MM HG: CPT | Performed by: INTERNAL MEDICINE

## 2025-05-29 PROCEDURE — 71046 X-RAY EXAM CHEST 2 VIEWS: CPT

## 2025-05-29 PROCEDURE — G2211 COMPLEX E/M VISIT ADD ON: HCPCS | Performed by: INTERNAL MEDICINE

## 2025-05-29 PROCEDURE — 99213 OFFICE O/P EST LOW 20 MIN: CPT | Performed by: INTERNAL MEDICINE

## 2025-05-29 PROCEDURE — 71046 X-RAY EXAM CHEST 2 VIEWS: CPT | Performed by: RADIOLOGY

## 2025-05-29 RX ORDER — BENZONATATE 200 MG/1
200 CAPSULE ORAL 3 TIMES DAILY PRN
Qty: 42 CAPSULE | Refills: 0 | Status: SHIPPED | OUTPATIENT
Start: 2025-05-29 | End: 2025-06-28

## 2025-05-29 RX ORDER — ALBUTEROL SULFATE 90 UG/1
2 INHALANT RESPIRATORY (INHALATION) EVERY 6 HOURS PRN
Qty: 18 G | Refills: 2 | Status: SHIPPED | OUTPATIENT
Start: 2025-05-29 | End: 2026-05-29

## 2025-05-29 ASSESSMENT — ENCOUNTER SYMPTOMS
GASTROINTESTINAL NEGATIVE: 1
EYES NEGATIVE: 1
ALLERGIC/IMMUNOLOGIC NEGATIVE: 1
ENDOCRINE NEGATIVE: 1
HEMATOLOGIC/LYMPHATIC NEGATIVE: 1
PSYCHIATRIC NEGATIVE: 1
COUGH: 1
NEUROLOGICAL NEGATIVE: 1
CARDIOVASCULAR NEGATIVE: 1

## 2025-05-29 NOTE — PROGRESS NOTES
"Subjective   Patient ID: Keyana Barcenas is a 80 y.o. female who presents for Cough.    Cough  Patient came back from vacation she has acute cough no sore throat she has no fever no shortness of breath she has wheezing on exam and has acute bronchitis seems viral in etiology.    Review of Systems   HENT: Negative.     Eyes: Negative.    Respiratory:  Positive for cough.    Cardiovascular: Negative.    Gastrointestinal: Negative.    Endocrine: Negative.    Allergic/Immunologic: Negative.    Neurological: Negative.    Hematological: Negative.    Psychiatric/Behavioral: Negative.     All other systems reviewed and are negative.      Objective   /76   Pulse 74   Resp 16   Ht 1.499 m (4' 11\")   Wt 78.5 kg (173 lb)   BMI 34.94 kg/m²     Physical Exam  Constitutional:       Appearance: Normal appearance.   HENT:      Head: Normocephalic and atraumatic.   Cardiovascular:      Rate and Rhythm: Normal rate and regular rhythm.      Pulses: Normal pulses.   Pulmonary:      Breath sounds: Rhonchi present.   Neurological:      Mental Status: She is alert.       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  Acute bronchitis are viral in etiology.  She has thrombophilia and is on warfarin with history of factor V Leiden.       "

## 2025-06-03 ENCOUNTER — TELEPHONE (OUTPATIENT)
Dept: PRIMARY CARE | Facility: CLINIC | Age: 80
End: 2025-06-03
Payer: MEDICARE

## 2025-06-03 DIAGNOSIS — J98.4 CALCIFIED GRANULOMA OF LUNG: ICD-10-CM

## 2025-06-03 RX ORDER — AMOXICILLIN AND CLAVULANATE POTASSIUM 875; 125 MG/1; MG/1
875 TABLET, FILM COATED ORAL 2 TIMES DAILY
Qty: 14 TABLET | Refills: 0 | Status: SHIPPED | OUTPATIENT
Start: 2025-06-03 | End: 2025-06-10

## 2025-06-04 ENCOUNTER — TELEPHONE (OUTPATIENT)
Dept: PRIMARY CARE | Facility: CLINIC | Age: 80
End: 2025-06-04
Payer: MEDICARE

## 2025-07-30 ENCOUNTER — APPOINTMENT (OUTPATIENT)
Dept: CARDIOLOGY | Facility: HOSPITAL | Age: 80
End: 2025-07-30
Payer: MEDICARE

## 2025-07-30 ENCOUNTER — APPOINTMENT (OUTPATIENT)
Dept: PRIMARY CARE | Facility: CLINIC | Age: 80
End: 2025-07-30
Payer: MEDICARE

## 2025-07-30 ENCOUNTER — HOSPITAL ENCOUNTER (OUTPATIENT)
Facility: HOSPITAL | Age: 80
Setting detail: OBSERVATION
Discharge: HOME | End: 2025-07-31
Attending: STUDENT IN AN ORGANIZED HEALTH CARE EDUCATION/TRAINING PROGRAM | Admitting: INTERNAL MEDICINE
Payer: MEDICARE

## 2025-07-30 ENCOUNTER — APPOINTMENT (OUTPATIENT)
Dept: RADIOLOGY | Facility: HOSPITAL | Age: 80
End: 2025-07-30
Payer: MEDICARE

## 2025-07-30 VITALS
HEART RATE: 88 BPM | TEMPERATURE: 95.8 F | DIASTOLIC BLOOD PRESSURE: 66 MMHG | HEIGHT: 59 IN | SYSTOLIC BLOOD PRESSURE: 140 MMHG | WEIGHT: 174 LBS | OXYGEN SATURATION: 98 % | BODY MASS INDEX: 35.08 KG/M2

## 2025-07-30 DIAGNOSIS — D68.51 FACTOR V LEIDEN (MULTI): ICD-10-CM

## 2025-07-30 DIAGNOSIS — R06.00 DYSPNEA, UNSPECIFIED: ICD-10-CM

## 2025-07-30 DIAGNOSIS — R07.9 CHEST PAIN, UNSPECIFIED TYPE: Primary | ICD-10-CM

## 2025-07-30 DIAGNOSIS — R06.09 DYSPNEA ON EXERTION: ICD-10-CM

## 2025-07-30 DIAGNOSIS — R07.89 OTHER CHEST PAIN: ICD-10-CM

## 2025-07-30 DIAGNOSIS — R06.02 SHORTNESS OF BREATH ON EXERTION: Primary | ICD-10-CM

## 2025-07-30 LAB
ALBUMIN SERPL BCP-MCNC: 4.2 G/DL (ref 3.4–5)
ALP SERPL-CCNC: 74 U/L (ref 33–136)
ALT SERPL W P-5'-P-CCNC: 10 U/L (ref 7–45)
ANION GAP SERPL CALC-SCNC: 12 MMOL/L (ref 10–20)
AST SERPL W P-5'-P-CCNC: 17 U/L (ref 9–39)
ATRIAL RATE: 80 BPM
BASOPHILS # BLD AUTO: 0.05 X10*3/UL (ref 0–0.1)
BASOPHILS NFR BLD AUTO: 0.5 %
BILIRUB SERPL-MCNC: 0.4 MG/DL (ref 0–1.2)
BNP SERPL-MCNC: 52 PG/ML (ref 0–99)
BUN SERPL-MCNC: 11 MG/DL (ref 6–23)
CALCIUM SERPL-MCNC: 9.2 MG/DL (ref 8.6–10.3)
CARDIAC TROPONIN I PNL SERPL HS: 11 NG/L (ref 0–13)
CARDIAC TROPONIN I PNL SERPL HS: 11 NG/L (ref 0–13)
CHLORIDE SERPL-SCNC: 102 MMOL/L (ref 98–107)
CO2 SERPL-SCNC: 30 MMOL/L (ref 21–32)
CREAT SERPL-MCNC: 0.8 MG/DL (ref 0.5–1.05)
D DIMER PPP FEU-MCNC: 242 NG/ML FEU
EGFRCR SERPLBLD CKD-EPI 2021: 74 ML/MIN/1.73M*2
EOSINOPHIL # BLD AUTO: 0.17 X10*3/UL (ref 0–0.4)
EOSINOPHIL NFR BLD AUTO: 1.5 %
ERYTHROCYTE [DISTWIDTH] IN BLOOD BY AUTOMATED COUNT: 14.4 % (ref 11.5–14.5)
FLUAV RNA RESP QL NAA+PROBE: NOT DETECTED
FLUBV RNA RESP QL NAA+PROBE: NOT DETECTED
GLUCOSE SERPL-MCNC: 98 MG/DL (ref 74–99)
HCT VFR BLD AUTO: 44.3 % (ref 36–46)
HGB BLD-MCNC: 14.4 G/DL (ref 12–16)
IMM GRANULOCYTES # BLD AUTO: 0.03 X10*3/UL (ref 0–0.5)
IMM GRANULOCYTES NFR BLD AUTO: 0.3 % (ref 0–0.9)
INR PPP: 1.4 (ref 0.9–1.1)
LYMPHOCYTES # BLD AUTO: 2.66 X10*3/UL (ref 0.8–3)
LYMPHOCYTES NFR BLD AUTO: 24.2 %
MAGNESIUM SERPL-MCNC: 2.1 MG/DL (ref 1.6–2.4)
MCH RBC QN AUTO: 30.9 PG (ref 26–34)
MCHC RBC AUTO-ENTMCNC: 32.5 G/DL (ref 32–36)
MCV RBC AUTO: 95 FL (ref 80–100)
MONOCYTES # BLD AUTO: 0.69 X10*3/UL (ref 0.05–0.8)
MONOCYTES NFR BLD AUTO: 6.3 %
NEUTROPHILS # BLD AUTO: 7.39 X10*3/UL (ref 1.6–5.5)
NEUTROPHILS NFR BLD AUTO: 67.2 %
NRBC BLD-RTO: 0 /100 WBCS (ref 0–0)
P AXIS: 50 DEGREES
P OFFSET: 194 MS
P ONSET: 171 MS
PLATELET # BLD AUTO: 238 X10*3/UL (ref 150–450)
POTASSIUM SERPL-SCNC: 4.2 MMOL/L (ref 3.5–5.3)
PR INTERVAL: 88 MS
PROT SERPL-MCNC: 7.2 G/DL (ref 6.4–8.2)
PROTHROMBIN TIME: 15.4 SECONDS (ref 9.8–12.4)
Q ONSET: 215 MS
QRS COUNT: 13 BEATS
QRS DURATION: 82 MS
QT INTERVAL: 398 MS
QTC CALCULATION(BAZETT): 459 MS
QTC FREDERICIA: 438 MS
R AXIS: -51 DEGREES
RBC # BLD AUTO: 4.66 X10*6/UL (ref 4–5.2)
SARS-COV-2 RNA RESP QL NAA+PROBE: NOT DETECTED
SODIUM SERPL-SCNC: 140 MMOL/L (ref 136–145)
T AXIS: 45 DEGREES
T OFFSET: 414 MS
VENTRICULAR RATE: 80 BPM
WBC # BLD AUTO: 11 X10*3/UL (ref 4.4–11.3)

## 2025-07-30 PROCEDURE — 80053 COMPREHEN METABOLIC PANEL: CPT | Performed by: STUDENT IN AN ORGANIZED HEALTH CARE EDUCATION/TRAINING PROGRAM

## 2025-07-30 PROCEDURE — 36415 COLL VENOUS BLD VENIPUNCTURE: CPT | Performed by: STUDENT IN AN ORGANIZED HEALTH CARE EDUCATION/TRAINING PROGRAM

## 2025-07-30 PROCEDURE — 99223 1ST HOSP IP/OBS HIGH 75: CPT | Performed by: INTERNAL MEDICINE

## 2025-07-30 PROCEDURE — 93005 ELECTROCARDIOGRAM TRACING: CPT

## 2025-07-30 PROCEDURE — 71045 X-RAY EXAM CHEST 1 VIEW: CPT | Performed by: RADIOLOGY

## 2025-07-30 PROCEDURE — 3078F DIAST BP <80 MM HG: CPT | Performed by: INTERNAL MEDICINE

## 2025-07-30 PROCEDURE — 85025 COMPLETE CBC W/AUTO DIFF WBC: CPT | Performed by: STUDENT IN AN ORGANIZED HEALTH CARE EDUCATION/TRAINING PROGRAM

## 2025-07-30 PROCEDURE — 87636 SARSCOV2 & INF A&B AMP PRB: CPT | Performed by: STUDENT IN AN ORGANIZED HEALTH CARE EDUCATION/TRAINING PROGRAM

## 2025-07-30 PROCEDURE — 2500000001 HC RX 250 WO HCPCS SELF ADMINISTERED DRUGS (ALT 637 FOR MEDICARE OP): Performed by: STUDENT IN AN ORGANIZED HEALTH CARE EDUCATION/TRAINING PROGRAM

## 2025-07-30 PROCEDURE — 84484 ASSAY OF TROPONIN QUANT: CPT | Performed by: STUDENT IN AN ORGANIZED HEALTH CARE EDUCATION/TRAINING PROGRAM

## 2025-07-30 PROCEDURE — G0378 HOSPITAL OBSERVATION PER HR: HCPCS

## 2025-07-30 PROCEDURE — 99214 OFFICE O/P EST MOD 30 MIN: CPT | Performed by: INTERNAL MEDICINE

## 2025-07-30 PROCEDURE — 3077F SYST BP >= 140 MM HG: CPT | Performed by: INTERNAL MEDICINE

## 2025-07-30 PROCEDURE — 83718 ASSAY OF LIPOPROTEIN: CPT | Performed by: NURSE PRACTITIONER

## 2025-07-30 PROCEDURE — 83735 ASSAY OF MAGNESIUM: CPT | Performed by: STUDENT IN AN ORGANIZED HEALTH CARE EDUCATION/TRAINING PROGRAM

## 2025-07-30 PROCEDURE — 99285 EMERGENCY DEPT VISIT HI MDM: CPT | Performed by: STUDENT IN AN ORGANIZED HEALTH CARE EDUCATION/TRAINING PROGRAM

## 2025-07-30 PROCEDURE — G2211 COMPLEX E/M VISIT ADD ON: HCPCS | Performed by: INTERNAL MEDICINE

## 2025-07-30 PROCEDURE — 1159F MED LIST DOCD IN RCRD: CPT | Performed by: INTERNAL MEDICINE

## 2025-07-30 PROCEDURE — 71045 X-RAY EXAM CHEST 1 VIEW: CPT

## 2025-07-30 PROCEDURE — 2500000001 HC RX 250 WO HCPCS SELF ADMINISTERED DRUGS (ALT 637 FOR MEDICARE OP): Performed by: INTERNAL MEDICINE

## 2025-07-30 PROCEDURE — 85610 PROTHROMBIN TIME: CPT | Performed by: STUDENT IN AN ORGANIZED HEALTH CARE EDUCATION/TRAINING PROGRAM

## 2025-07-30 PROCEDURE — 85379 FIBRIN DEGRADATION QUANT: CPT | Performed by: STUDENT IN AN ORGANIZED HEALTH CARE EDUCATION/TRAINING PROGRAM

## 2025-07-30 PROCEDURE — 83880 ASSAY OF NATRIURETIC PEPTIDE: CPT | Performed by: STUDENT IN AN ORGANIZED HEALTH CARE EDUCATION/TRAINING PROGRAM

## 2025-07-30 RX ORDER — BISACODYL 5 MG
10 TABLET, DELAYED RELEASE (ENTERIC COATED) ORAL DAILY PRN
Status: DISCONTINUED | OUTPATIENT
Start: 2025-07-30 | End: 2025-07-31 | Stop reason: HOSPADM

## 2025-07-30 RX ORDER — NITROGLYCERIN 0.4 MG/1
0.4 TABLET SUBLINGUAL EVERY 5 MIN PRN
Status: DISCONTINUED | OUTPATIENT
Start: 2025-07-30 | End: 2025-07-31 | Stop reason: HOSPADM

## 2025-07-30 RX ORDER — ACETAMINOPHEN 160 MG/5ML
650 SOLUTION ORAL EVERY 4 HOURS PRN
Status: DISCONTINUED | OUTPATIENT
Start: 2025-07-30 | End: 2025-07-31 | Stop reason: HOSPADM

## 2025-07-30 RX ORDER — ATORVASTATIN CALCIUM 20 MG/1
40 TABLET, FILM COATED ORAL NIGHTLY
Status: DISCONTINUED | OUTPATIENT
Start: 2025-07-30 | End: 2025-07-31 | Stop reason: HOSPADM

## 2025-07-30 RX ORDER — ALLOPURINOL 300 MG/1
300 TABLET ORAL DAILY
Status: DISCONTINUED | OUTPATIENT
Start: 2025-07-30 | End: 2025-07-31 | Stop reason: HOSPADM

## 2025-07-30 RX ORDER — WARFARIN 2 MG/1
4 TABLET ORAL DAILY
Status: DISCONTINUED | OUTPATIENT
Start: 2025-07-30 | End: 2025-07-31 | Stop reason: HOSPADM

## 2025-07-30 RX ORDER — LISINOPRIL 10 MG/1
10 TABLET ORAL DAILY
Status: DISCONTINUED | OUTPATIENT
Start: 2025-07-30 | End: 2025-07-31 | Stop reason: HOSPADM

## 2025-07-30 RX ORDER — NAPROXEN SODIUM 220 MG/1
324 TABLET, FILM COATED ORAL ONCE
Status: COMPLETED | OUTPATIENT
Start: 2025-07-30 | End: 2025-07-30

## 2025-07-30 RX ORDER — ACETAMINOPHEN 650 MG/1
650 SUPPOSITORY RECTAL EVERY 4 HOURS PRN
Status: DISCONTINUED | OUTPATIENT
Start: 2025-07-30 | End: 2025-07-31 | Stop reason: HOSPADM

## 2025-07-30 RX ORDER — FUROSEMIDE 40 MG/1
20 TABLET ORAL DAILY
Status: DISCONTINUED | OUTPATIENT
Start: 2025-07-30 | End: 2025-07-31 | Stop reason: HOSPADM

## 2025-07-30 RX ORDER — ONDANSETRON HYDROCHLORIDE 2 MG/ML
4 INJECTION, SOLUTION INTRAVENOUS EVERY 8 HOURS PRN
Status: DISCONTINUED | OUTPATIENT
Start: 2025-07-30 | End: 2025-07-31 | Stop reason: HOSPADM

## 2025-07-30 RX ORDER — ACETAMINOPHEN 325 MG/1
650 TABLET ORAL EVERY 4 HOURS PRN
Status: DISCONTINUED | OUTPATIENT
Start: 2025-07-30 | End: 2025-07-31 | Stop reason: HOSPADM

## 2025-07-30 RX ORDER — ONDANSETRON 4 MG/1
4 TABLET, FILM COATED ORAL EVERY 8 HOURS PRN
Status: DISCONTINUED | OUTPATIENT
Start: 2025-07-30 | End: 2025-07-31 | Stop reason: HOSPADM

## 2025-07-30 RX ORDER — POTASSIUM CHLORIDE 750 MG/1
10 TABLET, FILM COATED, EXTENDED RELEASE ORAL DAILY
Status: DISCONTINUED | OUTPATIENT
Start: 2025-07-30 | End: 2025-07-31 | Stop reason: HOSPADM

## 2025-07-30 RX ORDER — METHOCARBAMOL 500 MG/1
750 TABLET, FILM COATED ORAL 3 TIMES DAILY PRN
Status: DISCONTINUED | OUTPATIENT
Start: 2025-07-30 | End: 2025-07-31 | Stop reason: HOSPADM

## 2025-07-30 RX ORDER — NAPROXEN SODIUM 220 MG/1
81 TABLET, FILM COATED ORAL DAILY
Status: DISCONTINUED | OUTPATIENT
Start: 2025-07-31 | End: 2025-07-31 | Stop reason: HOSPADM

## 2025-07-30 RX ORDER — ALBUTEROL SULFATE 90 UG/1
2 INHALANT RESPIRATORY (INHALATION) EVERY 6 HOURS PRN
Status: DISCONTINUED | OUTPATIENT
Start: 2025-07-30 | End: 2025-07-31 | Stop reason: HOSPADM

## 2025-07-30 RX ORDER — POLYETHYLENE GLYCOL 3350 17 G/17G
17 POWDER, FOR SOLUTION ORAL DAILY
Status: DISCONTINUED | OUTPATIENT
Start: 2025-07-30 | End: 2025-07-31 | Stop reason: HOSPADM

## 2025-07-30 RX ADMIN — LISINOPRIL 10 MG: 10 TABLET ORAL at 19:59

## 2025-07-30 RX ADMIN — NITROGLYCERIN 0.4 MG: 0.4 TABLET SUBLINGUAL at 16:44

## 2025-07-30 RX ADMIN — ATORVASTATIN CALCIUM 40 MG: 20 TABLET, FILM COATED ORAL at 20:06

## 2025-07-30 RX ADMIN — WARFARIN SODIUM 4 MG: 2 TABLET ORAL at 19:59

## 2025-07-30 RX ADMIN — ASPIRIN 324 MG: 81 TABLET, CHEWABLE ORAL at 16:08

## 2025-07-30 SDOH — HEALTH STABILITY: PHYSICAL HEALTH: ON AVERAGE, HOW MANY MINUTES DO YOU ENGAGE IN EXERCISE AT THIS LEVEL?: PATIENT DECLINED

## 2025-07-30 SDOH — HEALTH STABILITY: PHYSICAL HEALTH
ON AVERAGE, HOW MANY DAYS PER WEEK DO YOU ENGAGE IN MODERATE TO STRENUOUS EXERCISE (LIKE A BRISK WALK)?: PATIENT DECLINED

## 2025-07-30 SDOH — ECONOMIC STABILITY: HOUSING INSECURITY: IN THE LAST 12 MONTHS, WAS THERE A TIME WHEN YOU WERE NOT ABLE TO PAY THE MORTGAGE OR RENT ON TIME?: PATIENT DECLINED

## 2025-07-30 SDOH — ECONOMIC STABILITY: HOUSING INSECURITY: IN THE PAST 12 MONTHS, HOW MANY TIMES HAVE YOU MOVED WHERE YOU WERE LIVING?: 0

## 2025-07-30 SDOH — SOCIAL STABILITY: SOCIAL INSECURITY: WERE YOU ABLE TO COMPLETE ALL THE BEHAVIORAL HEALTH SCREENINGS?: YES

## 2025-07-30 SDOH — SOCIAL STABILITY: SOCIAL NETWORK: HOW OFTEN DO YOU GET TOGETHER WITH FRIENDS OR RELATIVES?: MORE THAN THREE TIMES A WEEK

## 2025-07-30 SDOH — SOCIAL STABILITY: SOCIAL INSECURITY: ABUSE: ADULT

## 2025-07-30 SDOH — SOCIAL STABILITY: SOCIAL INSECURITY: DOES ANYONE TRY TO KEEP YOU FROM HAVING/CONTACTING OTHER FRIENDS OR DOING THINGS OUTSIDE YOUR HOME?: NO

## 2025-07-30 SDOH — SOCIAL STABILITY: SOCIAL INSECURITY
WITHIN THE LAST YEAR, HAVE YOU BEEN RAPED OR FORCED TO HAVE ANY KIND OF SEXUAL ACTIVITY BY YOUR PARTNER OR EX-PARTNER?: NO

## 2025-07-30 SDOH — HEALTH STABILITY: PHYSICAL HEALTH
HOW OFTEN DO YOU NEED TO HAVE SOMEONE HELP YOU WHEN YOU READ INSTRUCTIONS, PAMPHLETS, OR OTHER WRITTEN MATERIAL FROM YOUR DOCTOR OR PHARMACY?: SOMETIMES

## 2025-07-30 SDOH — SOCIAL STABILITY: SOCIAL INSECURITY: WITHIN THE LAST YEAR, HAVE YOU BEEN HUMILIATED OR EMOTIONALLY ABUSED IN OTHER WAYS BY YOUR PARTNER OR EX-PARTNER?: NO

## 2025-07-30 SDOH — SOCIAL STABILITY: SOCIAL NETWORK: HOW OFTEN DO YOU ATTEND MEETINGS OF THE CLUBS OR ORGANIZATIONS YOU BELONG TO?: PATIENT DECLINED

## 2025-07-30 SDOH — ECONOMIC STABILITY: HOUSING INSECURITY: AT ANY TIME IN THE PAST 12 MONTHS, WERE YOU HOMELESS OR LIVING IN A SHELTER (INCLUDING NOW)?: PATIENT DECLINED

## 2025-07-30 SDOH — SOCIAL STABILITY: SOCIAL INSECURITY: HAVE YOU HAD THOUGHTS OF HARMING ANYONE ELSE?: NO

## 2025-07-30 SDOH — ECONOMIC STABILITY: FOOD INSECURITY
WITHIN THE PAST 12 MONTHS, YOU WORRIED THAT YOUR FOOD WOULD RUN OUT BEFORE YOU GOT THE MONEY TO BUY MORE.: PATIENT DECLINED

## 2025-07-30 SDOH — SOCIAL STABILITY: SOCIAL INSECURITY
WITHIN THE LAST YEAR, HAVE YOU BEEN KICKED, HIT, SLAPPED, OR OTHERWISE PHYSICALLY HURT BY YOUR PARTNER OR EX-PARTNER?: NO

## 2025-07-30 SDOH — ECONOMIC STABILITY: FOOD INSECURITY: HOW HARD IS IT FOR YOU TO PAY FOR THE VERY BASICS LIKE FOOD, HOUSING, MEDICAL CARE, AND HEATING?: PATIENT DECLINED

## 2025-07-30 SDOH — SOCIAL STABILITY: SOCIAL INSECURITY: ARE YOU MARRIED, WIDOWED, DIVORCED, SEPARATED, NEVER MARRIED, OR LIVING WITH A PARTNER?: WIDOWED

## 2025-07-30 SDOH — SOCIAL STABILITY: SOCIAL NETWORK
IN A TYPICAL WEEK, HOW MANY TIMES DO YOU TALK ON THE PHONE WITH FAMILY, FRIENDS, OR NEIGHBORS?: MORE THAN THREE TIMES A WEEK

## 2025-07-30 SDOH — HEALTH STABILITY: MENTAL HEALTH
DO YOU FEEL STRESS - TENSE, RESTLESS, NERVOUS, OR ANXIOUS, OR UNABLE TO SLEEP AT NIGHT BECAUSE YOUR MIND IS TROUBLED ALL THE TIME - THESE DAYS?: ONLY A LITTLE

## 2025-07-30 SDOH — SOCIAL STABILITY: SOCIAL INSECURITY: DO YOU FEEL UNSAFE GOING BACK TO THE PLACE WHERE YOU ARE LIVING?: NO

## 2025-07-30 SDOH — SOCIAL STABILITY: SOCIAL NETWORK
DO YOU BELONG TO ANY CLUBS OR ORGANIZATIONS SUCH AS CHURCH GROUPS, UNIONS, FRATERNAL OR ATHLETIC GROUPS, OR SCHOOL GROUPS?: NO

## 2025-07-30 SDOH — SOCIAL STABILITY: SOCIAL INSECURITY: WITHIN THE LAST YEAR, HAVE YOU BEEN AFRAID OF YOUR PARTNER OR EX-PARTNER?: NO

## 2025-07-30 SDOH — ECONOMIC STABILITY: FOOD INSECURITY: WITHIN THE PAST 12 MONTHS, THE FOOD YOU BOUGHT JUST DIDN'T LAST AND YOU DIDN'T HAVE MONEY TO GET MORE.: PATIENT DECLINED

## 2025-07-30 SDOH — SOCIAL STABILITY: SOCIAL INSECURITY: ARE THERE ANY APPARENT SIGNS OF INJURIES/BEHAVIORS THAT COULD BE RELATED TO ABUSE/NEGLECT?: NO

## 2025-07-30 SDOH — SOCIAL STABILITY: SOCIAL NETWORK: HOW OFTEN DO YOU ATTEND CHURCH OR RELIGIOUS SERVICES?: MORE THAN 4 TIMES PER YEAR

## 2025-07-30 SDOH — ECONOMIC STABILITY: INCOME INSECURITY
IN THE PAST 12 MONTHS HAS THE ELECTRIC, GAS, OIL, OR WATER COMPANY THREATENED TO SHUT OFF SERVICES IN YOUR HOME?: PATIENT DECLINED

## 2025-07-30 SDOH — SOCIAL STABILITY: SOCIAL INSECURITY: HAVE YOU HAD ANY THOUGHTS OF HARMING ANYONE ELSE?: NO

## 2025-07-30 SDOH — SOCIAL STABILITY: SOCIAL INSECURITY: DO YOU FEEL ANYONE HAS EXPLOITED OR TAKEN ADVANTAGE OF YOU FINANCIALLY OR OF YOUR PERSONAL PROPERTY?: NO

## 2025-07-30 SDOH — ECONOMIC STABILITY: TRANSPORTATION INSECURITY: IN THE PAST 12 MONTHS, HAS LACK OF TRANSPORTATION KEPT YOU FROM MEDICAL APPOINTMENTS OR FROM GETTING MEDICATIONS?: NO

## 2025-07-30 SDOH — SOCIAL STABILITY: SOCIAL INSECURITY: HAS ANYONE EVER THREATENED TO HURT YOUR FAMILY OR YOUR PETS?: NO

## 2025-07-30 SDOH — SOCIAL STABILITY: SOCIAL INSECURITY: ARE YOU OR HAVE YOU BEEN THREATENED OR ABUSED PHYSICALLY, EMOTIONALLY, OR SEXUALLY BY ANYONE?: NO

## 2025-07-30 ASSESSMENT — ENCOUNTER SYMPTOMS
NEUROLOGICAL NEGATIVE: 1
SHORTNESS OF BREATH: 1
CONSTITUTIONAL NEGATIVE: 1
GASTROINTESTINAL NEGATIVE: 1
MUSCULOSKELETAL NEGATIVE: 1
PSYCHIATRIC NEGATIVE: 1
EYES NEGATIVE: 1
CONSTITUTIONAL NEGATIVE: 1
COUGH: 1
CARDIOVASCULAR NEGATIVE: 1
ENDOCRINE NEGATIVE: 1
CHEST TIGHTNESS: 1
ALLERGIC/IMMUNOLOGIC NEGATIVE: 1
HEMATOLOGIC/LYMPHATIC NEGATIVE: 1
EYES NEGATIVE: 1

## 2025-07-30 ASSESSMENT — COGNITIVE AND FUNCTIONAL STATUS - GENERAL
CLIMB 3 TO 5 STEPS WITH RAILING: A LOT
MOBILITY SCORE: 21
PATIENT BASELINE BEDBOUND: NO
WALKING IN HOSPITAL ROOM: A LITTLE
DAILY ACTIVITIY SCORE: 24

## 2025-07-30 ASSESSMENT — LIFESTYLE VARIABLES
TOTAL SCORE: 0
EVER HAD A DRINK FIRST THING IN THE MORNING TO STEADY YOUR NERVES TO GET RID OF A HANGOVER: NO
HOW MANY STANDARD DRINKS CONTAINING ALCOHOL DO YOU HAVE ON A TYPICAL DAY: PATIENT DOES NOT DRINK
SKIP TO QUESTIONS 9-10: 1
HOW OFTEN DO YOU HAVE 6 OR MORE DRINKS ON ONE OCCASION: NEVER
HAVE YOU EVER FELT YOU SHOULD CUT DOWN ON YOUR DRINKING: NO
AUDIT-C TOTAL SCORE: 0
HAVE PEOPLE ANNOYED YOU BY CRITICIZING YOUR DRINKING: NO
HOW OFTEN DO YOU HAVE A DRINK CONTAINING ALCOHOL: NEVER
AUDIT-C TOTAL SCORE: 0
EVER FELT BAD OR GUILTY ABOUT YOUR DRINKING: NO

## 2025-07-30 ASSESSMENT — PATIENT HEALTH QUESTIONNAIRE - PHQ9
2. FEELING DOWN, DEPRESSED OR HOPELESS: NOT AT ALL
1. LITTLE INTEREST OR PLEASURE IN DOING THINGS: NOT AT ALL
1. LITTLE INTEREST OR PLEASURE IN DOING THINGS: NOT AT ALL
SUM OF ALL RESPONSES TO PHQ9 QUESTIONS 1 & 2: 0
SUM OF ALL RESPONSES TO PHQ9 QUESTIONS 1 & 2: 0
2. FEELING DOWN, DEPRESSED OR HOPELESS: NOT AT ALL

## 2025-07-30 ASSESSMENT — PAIN SCALES - GENERAL
PAINLEVEL_OUTOF10: 3
PAINLEVEL_OUTOF10: 0 - NO PAIN

## 2025-07-30 ASSESSMENT — ACTIVITIES OF DAILY LIVING (ADL)
HEARING - RIGHT EAR: FUNCTIONAL
GROOMING: INDEPENDENT
TOILETING: INDEPENDENT
LACK_OF_TRANSPORTATION: NO
FEEDING YOURSELF: INDEPENDENT
BATHING: INDEPENDENT
WALKS IN HOME: NEEDS ASSISTANCE
HEARING - LEFT EAR: FUNCTIONAL
PATIENT'S MEMORY ADEQUATE TO SAFELY COMPLETE DAILY ACTIVITIES?: YES
DRESSING YOURSELF: INDEPENDENT
LACK_OF_TRANSPORTATION: NO
JUDGMENT_ADEQUATE_SAFELY_COMPLETE_DAILY_ACTIVITIES: YES
ADEQUATE_TO_COMPLETE_ADL: YES

## 2025-07-30 ASSESSMENT — PAIN DESCRIPTION - PAIN TYPE: TYPE: ACUTE PAIN

## 2025-07-30 ASSESSMENT — PAIN DESCRIPTION - DESCRIPTORS: DESCRIPTORS: ACHING

## 2025-07-30 ASSESSMENT — PAIN DESCRIPTION - FREQUENCY: FREQUENCY: INTERMITTENT

## 2025-07-30 ASSESSMENT — PAIN - FUNCTIONAL ASSESSMENT: PAIN_FUNCTIONAL_ASSESSMENT: 0-10

## 2025-07-30 ASSESSMENT — PAIN DESCRIPTION - LOCATION: LOCATION: CHEST

## 2025-07-30 ASSESSMENT — PAIN DESCRIPTION - ORIENTATION: ORIENTATION: MID

## 2025-07-30 NOTE — H&P
History Of Present Illness  Keyana Barcenas is a 80 y.o. female with past medical history of hypertension, DVTs factor V Leiden on Coumadin, gout, GERD, neuropathy, vertigo comes into the hospital due to shortness of breath that has been ongoing for past 3 weeks.  The patient saw her PCP and was referred to the hospital as her shortness of breath is significant and at times is at rest.  She also reports chest pressure at times.  She states she had chest pain a few days ago as well.  The patient did receive nitroglycerin in ED but she reports she still has some chest pressure.  She denies any chest pain.  She denies any dizziness lightheadedness but she reports palpitations at times.  The patient has been compliant with her Coumadin and states her INR has been stable on Coumadin.  In ED troponins were within normal limits and chest x-ray was unremarkable.  The patient is admitted for further care and management     Past Medical History  Medical History[1]    Surgical History  Surgical History[2]     Social History  She reports that she has quit smoking. Her smoking use included cigarettes. She has a 12.5 pack-year smoking history. She has never used smokeless tobacco. She reports that she does not currently use alcohol. She reports that she does not use drugs.    Family History  Family History[3]     Allergies  Erythromycin, Erythromycin-benzoyl peroxide, Iodinated contrast media, and Povidone-iodine    Review of Systems   Constitutional: Negative.    HENT: Negative.     Eyes: Negative.    Respiratory:  Positive for chest tightness.    Cardiovascular:  Positive for chest pain and leg swelling.   Gastrointestinal: Negative.    Endocrine: Negative.    Musculoskeletal: Negative.    Skin: Negative.    Allergic/Immunologic: Negative.    Psychiatric/Behavioral: Negative.     All other systems reviewed and are negative.       Physical Exam  Constitutional:       Appearance: Normal appearance.   HENT:      Head:  "Normocephalic and atraumatic.      Nose: Nose normal.      Mouth/Throat:      Mouth: Mucous membranes are dry.     Eyes:      Extraocular Movements: Extraocular movements intact.      Conjunctiva/sclera: Conjunctivae normal.       Cardiovascular:      Rate and Rhythm: Normal rate and regular rhythm.      Pulses: Normal pulses.      Heart sounds: Normal heart sounds.   Pulmonary:      Effort: Pulmonary effort is normal.      Breath sounds: Normal breath sounds.   Abdominal:      General: Bowel sounds are normal.      Palpations: Abdomen is soft.     Musculoskeletal:         General: Normal range of motion.      Cervical back: Normal range of motion and neck supple.      Right lower leg: Edema present.      Left lower leg: Edema present.     Skin:     General: Skin is warm and dry.     Neurological:      General: No focal deficit present.      Mental Status: She is alert and oriented to person, place, and time. Mental status is at baseline.     Psychiatric:         Mood and Affect: Mood normal.          Last Recorded Vitals  Blood pressure 119/67, pulse 92, temperature 36 °C (96.8 °F), temperature source Temporal, resp. rate 18, height (!) 1.499 m (4' 11\"), weight 78.9 kg (174 lb), SpO2 99%.    Relevant Results      Keyana Barcenas is a 80 y.o. female with past medical history of hypertension, DVTs factor V Leiden on Coumadin, gout, GERD, neuropathy, vertigo comes into the hospital due to shortness of breath that has been ongoing for past 3 weeks.      Assessment & Plan  Shortness of breath on exertion      Chest pain  Dyspnea on exertion  -Admit to observation in a monitored bed  - Serial troponins and EKG  -Continue with aspirin and statin at this time  - Echo has been ordered  - Cardiology consultation  - Check D-dimer.  If elevated may need to consider VQ scan.  Patient has allergy to the dye    Factor V Leiden  History of DVTs  - Continue with Coumadin check INR    Hypertension  GERD  Gout  - Continue " appropriate medications once verified    Frequent falls  - Will have PT OT evaluate the patient for further recommendations    I spent 45 minutes in the professional and overall care of this patient.      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 01/12/2021    Drug or chemical induced diabetes mellitus, controlled, with hyperglycemia    Gout, unspecified 05/04/2022    Gout    Personal history of other specified conditions 12/18/2016    History of fatigue    Personal history of other venous thrombosis and embolism 04/01/2019    History of deep venous thrombosis    Personal history of other venous thrombosis and embolism 12/18/2016    History of deep venous thrombosis    Personal history of urinary (tract) infections 12/09/2016    History of UTI   [2]   Past Surgical History:  Procedure Laterality Date    BLADDER SURGERY  12/18/2016    Bladder Surgery    CARDIAC CATHETERIZATION      CATARACT EXTRACTION  12/18/2016    Cataract Extraction    FL DECLOT BY THROMOBOLYTIC AGENT OF VAD OR CATHETER GUIDANCE Left     HYSTERECTOMY  12/18/2016    Hysterectomy    TONSILLECTOMY  12/18/2016    Tonsillectomy   [3]   Family History  Problem Relation Name Age of Onset    Coronary artery disease Mother      Breast cancer Mother      Rheum arthritis Mother      Colon cancer Son      Colon cancer Maternal Grandmother

## 2025-07-30 NOTE — ED PROVIDER NOTES
HPI: The patient is a 80-year-old with history of factor V Leiden who is on warfarin for clots, who presents to the Emergency Department with a chief complaint of chest pain and dyspnea with exertion for the last 3 weeks.  Patient reports that she has had progressive shortness of breath with even mild exertion such as walking.  She reports she has had no significant weight gain and is able to lie flat even when she is in bed.  She reports that she has longstanding swelling in her legs due to previous DVTs and is currently on warfarin and has not missed any doses.  Denies any black or bloody stools.  Denies any cough.  Denies any sputum production.  She reports that she has developed chest pain when she has these episodes of shortness of breath as well as reports sometimes getting diaphoretic.  She went to see her primary care doctor today who sent her into the ER due to these concerning symptoms.  She reports that even if she sitting right here she has not chest pain shortness of breath.  She reports contrast allergy and requires a prolonged prep for contrast.     PAST MEDICAL HISTORY:  as per HPI  ALLERGIES:  as per HPI  MEDICATIONS:  as per HPI  FAMILY HISTORY: as per HPI  SURGICAL HISTORY: as per HPI  SOCIAL HISTORY: as per HPI     PHYSICAL EXAM:  VITAL SIGNS: Nursing notes reviewed.  GENERAL:  Alert and interactive  EYES:   Eyes track.  ENT:  Airway patent.  RESPIRATORY:  Nonlabored breathing.  Clear bilaterally.  CARDIOVASCULAR:  [Regular rate.] [Regular rhythm.]  GASTROINTESTINAL:  No distension.  MUSCULOSKELETAL:  No deformity.  Swelling is equal in bilateral lower extremities.  NEUROLOGICAL:  Awake.  SKIN:  Dry.        MEDICAL DECISION MAKING (MDM):     DIAGNOSTIC STUDIES  Labs: CBC, CMP, magnesium level, INR, viral swabs, troponin  Radiology: Chest x-ray     EKG 1541  Per my interpretation:  Electrocardiogram ECG  RATE:  [Normal]  RHYTHM: [Sinus]  AXIS: Left axis deviation  INTERVALS: [Normal]  ST-T WAVE  CHANGES: [Normal]  ABNORMALITIES/COMPARISON: Unchanged from most recent EKG on September 9/2023    EKG 1712  Per my interpretation:  Electrocardiogram ECG  RATE:  [Normal]  RHYTHM: [Sinus]  AXIS: Left axis deviation  INTERVALS: [Normal]  ST-T WAVE CHANGES: [Normal]  ABNORMALITIES/COMPARISON: Unchanged from earlier today       REVIEW OF OLD RECORDS: Reviewed the outpatient progress note from Dr. Muniz from earlier today     SUMMARY:  The patient is admitted to the Emergency Department for evaluation of above. Complete history and physical examination was performed by me.  Patient presents with chest pain dyspnea with exertion.  Patient's presentation is concerning for cardiac causes.  EKG is not consistent with occlusive MI and given she is on warfarin and does not have tachycardia or hypoxia nor does she have evidence of acute right heart strain on EKG, my suspicion for PE is low and I feel that the risks of a contrasted scan outweigh the benefits at this point.  Given the symptoms are with exertion and been going on for a few weeks this points away from acute aortic syndrome.  Infectious causes also seem less likely.    Patient's blood work was reassuring although she had a slightly subtherapeutic INR.  Patient reports that she has a severe allergic reaction to contrast and given my low suspicion for PE, I held off on CTA at this time given her troponin was not elevated and her EKG was not consistent with acute right heart strain so if she did turn out to have a PE, it is not causing any acute life threat and she is going to be on anticoagulation long-term going forward so it would likely break down a smaller clot.  Patient reported moderate chest pressure persistently so we gave her some nitro.  Given patient's dyspnea with exertion and chest pressure with exertion and her elevated heart score, I felt the patient should be admitted for further assessment and care.     DIAGNOSIS:    Chest pain  Dyspnea with  exertion     DISPOSITION:    1) admission       Raghavendra Espino MD  07/30/25 [id number]

## 2025-07-30 NOTE — PROGRESS NOTES
"Subjective   Patient ID: Keyana Barcenas is a 80 y.o. female who presents for Acute  (Pt here for Acute  trouble breathing approx 3 weeks   any activity she  gets short of breath, has pain at right shoulder    talks  for a bit and starts to slobber seems very fatigued no strength).    Patient is here for acute visit she has been getting short of breath for last 3 weeks she had pneumonia about 2 months ago and now forelock 3 weeks she is getting progressively short of breath.  She does have a history of factor V Leiden genetic mutation and is on warfarin.  She also is getting random chest pains         Review of Systems   Constitutional: Negative.    HENT: Negative.     Eyes: Negative.    Respiratory:  Positive for cough and shortness of breath.    Cardiovascular: Negative.    Genitourinary: Negative.    Neurological: Negative.    Hematological: Negative.    All other systems reviewed and are negative.      Objective   /66   Pulse 88   Temp 35.4 °C (95.8 °F) (Temporal)   Ht (!) 1.499 m (4' 11\")   Wt 78.9 kg (174 lb)   SpO2 98%   BMI 35.14 kg/m²     Physical Exam  Vitals reviewed.   Constitutional:       Appearance: Normal appearance.     Eyes:      Conjunctiva/sclera: Conjunctivae normal.       Cardiovascular:      Rate and Rhythm: Normal rate and regular rhythm.      Pulses: Normal pulses.      Heart sounds: Normal heart sounds.   Pulmonary:      Effort: Pulmonary effort is normal.      Breath sounds: Normal breath sounds.     Musculoskeletal:      Right lower leg: No edema.      Left lower leg: No edema.     Neurological:      General: No focal deficit present.      Mental Status: She is alert. Mental status is at baseline.     Psychiatric:         Mood and Affect: Mood normal.         Assessment/Plan   Problem List Items Addressed This Visit           ICD-10-CM    Factor V Leiden (Multi) D68.51    Shortness of breath on exertion - Primary R06.02     Other Visit Diagnoses         Codes      " Other chest pain     R07.89          In view of her symptoms of chest pain shortness of breath and history of recurrent DVTs we have advised her to go right away to emergency room to get workup done and be evaluated for her dyspnea and chest pains.  She may need admission at the hospital based on the findings in the ER.

## 2025-07-31 ENCOUNTER — PHARMACY VISIT (OUTPATIENT)
Dept: PHARMACY | Facility: CLINIC | Age: 80
End: 2025-07-31
Payer: COMMERCIAL

## 2025-07-31 ENCOUNTER — APPOINTMENT (OUTPATIENT)
Dept: CARDIOLOGY | Facility: HOSPITAL | Age: 80
End: 2025-07-31
Payer: MEDICARE

## 2025-07-31 VITALS
OXYGEN SATURATION: 95 % | HEIGHT: 59 IN | BODY MASS INDEX: 35.08 KG/M2 | DIASTOLIC BLOOD PRESSURE: 93 MMHG | WEIGHT: 174 LBS | HEART RATE: 88 BPM | RESPIRATION RATE: 16 BRPM | SYSTOLIC BLOOD PRESSURE: 128 MMHG | TEMPERATURE: 98.2 F

## 2025-07-31 LAB
AORTIC VALVE MEAN GRADIENT: 4 MMHG
AORTIC VALVE PEAK VELOCITY: 1.29 M/S
ATRIAL RATE: 90 BPM
AV PEAK GRADIENT: 7 MMHG
AVA (PEAK VEL): 1.73 CM2
AVA (VTI): 1.6 CM2
CHOLEST SERPL-MCNC: 195 MG/DL (ref 0–199)
CHOLESTEROL/HDL RATIO: 3.8
EJECTION FRACTION APICAL 4 CHAMBER: 56.3
EJECTION FRACTION: 58 %
HDLC SERPL-MCNC: 51.3 MG/DL
LDLC SERPL CALC-MCNC: 116 MG/DL
LEFT ATRIUM VOLUME AREA LENGTH INDEX BSA: 24.3 ML/M2
LEFT VENTRICLE INTERNAL DIMENSION DIASTOLE: 4.01 CM (ref 3.5–6)
LEFT VENTRICULAR OUTFLOW TRACT DIAMETER: 1.82 CM
LV EJECTION FRACTION BIPLANE: 56 %
MITRAL VALVE E/A RATIO: 0.65
NON HDL CHOLESTEROL: 144 MG/DL (ref 0–149)
P AXIS: 105 DEGREES
P OFFSET: 175 MS
P ONSET: 145 MS
PR INTERVAL: 144 MS
Q ONSET: 217 MS
QRS COUNT: 14 BEATS
QRS DURATION: 78 MS
QT INTERVAL: 376 MS
QTC CALCULATION(BAZETT): 459 MS
QTC FREDERICIA: 430 MS
R AXIS: -48 DEGREES
RIGHT VENTRICLE FREE WALL PEAK S': 13.9 CM/S
RIGHT VENTRICLE PEAK SYSTOLIC PRESSURE: 27 MMHG
T AXIS: 44 DEGREES
T OFFSET: 405 MS
TRICUSPID ANNULAR PLANE SYSTOLIC EXCURSION: 1.8 CM
TRIGL SERPL-MCNC: 138 MG/DL (ref 0–149)
VENTRICULAR RATE: 90 BPM
VLDL: 28 MG/DL (ref 0–40)

## 2025-07-31 PROCEDURE — 99223 1ST HOSP IP/OBS HIGH 75: CPT | Performed by: INTERNAL MEDICINE

## 2025-07-31 PROCEDURE — 99239 HOSP IP/OBS DSCHRG MGMT >30: CPT | Performed by: HOSPITALIST

## 2025-07-31 PROCEDURE — 97165 OT EVAL LOW COMPLEX 30 MIN: CPT | Mod: GO

## 2025-07-31 PROCEDURE — C8929 TTE W OR WO FOL WCON,DOPPLER: HCPCS

## 2025-07-31 PROCEDURE — 93306 TTE W/DOPPLER COMPLETE: CPT | Performed by: INTERNAL MEDICINE

## 2025-07-31 PROCEDURE — 2500000004 HC RX 250 GENERAL PHARMACY W/ HCPCS (ALT 636 FOR OP/ED): Performed by: INTERNAL MEDICINE

## 2025-07-31 PROCEDURE — G0378 HOSPITAL OBSERVATION PER HR: HCPCS

## 2025-07-31 PROCEDURE — 93010 ELECTROCARDIOGRAM REPORT: CPT | Performed by: INTERNAL MEDICINE

## 2025-07-31 PROCEDURE — 97161 PT EVAL LOW COMPLEX 20 MIN: CPT | Mod: GP | Performed by: PHYSICAL THERAPIST

## 2025-07-31 PROCEDURE — RXMED WILLOW AMBULATORY MEDICATION CHARGE

## 2025-07-31 PROCEDURE — 93005 ELECTROCARDIOGRAM TRACING: CPT

## 2025-07-31 PROCEDURE — 2500000002 HC RX 250 W HCPCS SELF ADMINISTERED DRUGS (ALT 637 FOR MEDICARE OP, ALT 636 FOR OP/ED): Performed by: INTERNAL MEDICINE

## 2025-07-31 PROCEDURE — 2500000001 HC RX 250 WO HCPCS SELF ADMINISTERED DRUGS (ALT 637 FOR MEDICARE OP): Performed by: INTERNAL MEDICINE

## 2025-07-31 RX ORDER — METOPROLOL SUCCINATE 25 MG/1
25 TABLET, EXTENDED RELEASE ORAL DAILY
Qty: 30 TABLET | Refills: 11 | Status: SHIPPED | OUTPATIENT
Start: 2025-07-31 | End: 2026-07-31

## 2025-07-31 RX ADMIN — POTASSIUM CHLORIDE 10 MEQ: 750 TABLET, EXTENDED RELEASE ORAL at 08:59

## 2025-07-31 RX ADMIN — ASPIRIN 81 MG: 81 TABLET, CHEWABLE ORAL at 08:59

## 2025-07-31 RX ADMIN — LISINOPRIL 10 MG: 10 TABLET ORAL at 08:59

## 2025-07-31 RX ADMIN — ALLOPURINOL 300 MG: 300 TABLET ORAL at 08:59

## 2025-07-31 RX ADMIN — PERFLUTREN 2.5 ML OF DILUTION: 6.52 INJECTION, SUSPENSION INTRAVENOUS at 08:15

## 2025-07-31 ASSESSMENT — PAIN SCALES - GENERAL
PAINLEVEL_OUTOF10: 0 - NO PAIN

## 2025-07-31 ASSESSMENT — COGNITIVE AND FUNCTIONAL STATUS - GENERAL
DRESSING REGULAR UPPER BODY CLOTHING: A LITTLE
DRESSING REGULAR LOWER BODY CLOTHING: A LITTLE
TURNING FROM BACK TO SIDE WHILE IN FLAT BAD: A LITTLE
MOVING TO AND FROM BED TO CHAIR: A LITTLE
MOBILITY SCORE: 24
HELP NEEDED FOR BATHING: A LITTLE
WALKING IN HOSPITAL ROOM: A LITTLE
STANDING UP FROM CHAIR USING ARMS: A LITTLE
TOILETING: A LITTLE
DAILY ACTIVITIY SCORE: 20
MOVING FROM LYING ON BACK TO SITTING ON SIDE OF FLAT BED WITH BEDRAILS: A LITTLE
DAILY ACTIVITIY SCORE: 24

## 2025-07-31 ASSESSMENT — PAIN - FUNCTIONAL ASSESSMENT
PAIN_FUNCTIONAL_ASSESSMENT: 0-10

## 2025-07-31 ASSESSMENT — ACTIVITIES OF DAILY LIVING (ADL): BATHING_ASSISTANCE: MODIFIED INDEPENDENT (DEVICE)

## 2025-07-31 NOTE — PROGRESS NOTES
Occupational Therapy    Evaluation    Patient Name: Keyana Barcenas  MRN: [id number]  Today's Date: 7/31/2025  Time Calculation  Start Time: 0907  Stop Time: 0923  Time Calculation (min): 16 min  918/918-A    Assessment  IP OT Assessment  OT Assessment: Pt is MOD I <> IND with all ADLs and functional transfers, does not qualify for acute OT at this time. Recommend use of FWW at home.  Barriers to Discharge Home: No anticipated barriers  Evaluation/Treatment Tolerance: Patient tolerated treatment well  Medical Staff Made Aware: Yes  End of Session Communication: Bedside nurse, Care Coordinator  End of Session Patient Position: Bed, 2 rail up, Alarm off, not on at start of session    Plan:  No Skilled OT: No acute OT goals identified  OT Frequency: OT eval only  OT Discharge Recommendations: No further acute OT  Equipment Recommended upon Discharge: Wheeled walker  OT - OK to Discharge: Yes (ok to d/c to next level of care once medically cleared)    Subjective     Current Problem:  1. Chest pain, unspecified type  Transthoracic Echo Complete    Transthoracic Echo Complete      2. Dyspnea on exertion        3. Dyspnea, unspecified  Transthoracic Echo Complete    metoprolol succinate XL (Toprol-XL) 25 mg 24 hr tablet          General:  General  Reason for Referral: ADL impairment  Referred By: PT/OT 7/30/25 Pernell  Past Medical History Relevant to Rehab: Arthritis, Depression, Anxiety, GERD, Gout, DVT, Hyster, PCI, Bladder surgery, Coumadin, Bilateral SNHL, obesity, vertigo, factor V leiden  Co-Treatment: PT  Co-Treatment Reason: to maximize patient and staff safety  Patient Position Received: Bed, 2 rail up, Alarm off, not on at start of session  General Comment: To ED 7/30/25 for chest pain and dyspnea. Was at PCP and sent over due to increased BP. and chest aching. Flu A/B (-); C19 (-0; BNP 52; Troponin 11    Precautions:  Medical Precautions: Fall precautions    Pain:  Pain Assessment  Pain Assessment:  0-10  0-10 (Numeric) Pain Score: 0 - No pain    Objective     Cognition:  Overall Cognitive Status: Within Functional Limits (tangential speech, cooperative, pleasant)  Orientation Level: Oriented X4             Home Living:  Home Living Comments: Pt reports living alone in 2-level home with basement. Has first floor set-up with bedroom, bathroom, laundry all on first floor. Has a walk in shower with seat and grab bar. Raised toilet and owns BSC, cane, rollator. States she is fearful of using rollator.     Prior Function:  Prior Function Comments: PTA, pt reports IND with mobility without device but frequently uses walls/furniture for steadying, IND with ADLs/iADLs. Admits to a few falls in the past 3 months where she tripped over her cat. Pt also drags LLE since she had Covid.    ADL:  Eating Assistance: Independent  Grooming Assistance: Independent  Bathing Assistance: Modified independent (Device)  UE Dressing Assistance: Independent  LE Dressing Assistance: Modified independent (Device)  Toileting Assistance with Device: Modified independent    Activity Tolerance:  Endurance: Endurance does not limit participation in activity (Pt reports SOB with ambulation, however SpO2 97%, HR 95)    Bed Mobility/Transfers:   Bed Mobility  Bed Mobility: Yes (Transition from supine <> sit EOB IND)  Transfers  Transfer:  (STS bed level IND)    Ambulation/Gait Training:  Functional Mobility  Functional Mobility Performed: Yes  Functional Mobility 1  Comments 1: Pt completed functional mobility max household distances throughout room/hallways x2 trials. On first trial, pt did not use a device and guided hand along wall for steadying with SBA. On second trial, pt used FWW with MOD I. Educated pt on recommendation of FWW for home, pt very receptive and states she feels more steady using FWW.    Sitting Balance:  Static Sitting Balance  Static Sitting-Comment/Number of Minutes: Good  Dynamic Sitting Balance  Dynamic Sitting-Comments:  Good    Standing Balance:  Static Standing Balance  Static Standing-Comment/Number of Minutes: Good  Dynamic Standing Balance  Dynamic Standing-Comments: Good (with BUE support from FWW)    Sensation:  Light Touch: No apparent deficits    Strength:  Strength Comments: ALEC shoulders 3-/5, ALEC elbows/wrists/hands 4/5    Hand Function:  Hand Function  Gross Grasp: Functional    Extremities: RUE   RUE : Within Functional Limits (decreased shoulder AROM d/t arthritis, however, functional) and LUE   LUE: Within Functional Limits (decreased shoulder AROM d/t arthritis, however, functional)    Outcome Measures: [facility] Daily Activity  Putting on and taking off regular lower body clothing: None  Bathing (including washing, rinsing, drying): None  Putting on and taking off regular upper body clothing: None  Toileting, which includes using toilet, bedpan or urinal: None  Taking care of personal grooming such as brushing teeth: None  Eating Meals: None  Daily Activity - Total Score: 24                       EDUCATION:  Education  Individual(s) Educated: Patient  Education Provided: Fall precautons, Risk and benefits of OT discussed with patient or other, POC discussed and agreed upon  Patient Response to Education: Patient/Caregiver Verbalized Understanding of Information  Education Documentation  Body Mechanics, taught by Kinza Mohan OT at 7/31/2025 11:53 AM.  Learner: Patient  Readiness: Acceptance  Method: Explanation  Response: Verbalizes Understanding, Demonstrated Understanding, Needs Reinforcement    ADL Training, taught by Kinza Mohan OT at 7/31/2025 11:53 AM.  Learner: Patient  Readiness: Acceptance  Method: Explanation  Response: Verbalizes Understanding, Demonstrated Understanding, Needs Reinforcement

## 2025-07-31 NOTE — CONSULTS
Cardiology Consult Note      Date:   7/31/2025  Patient name:  Keyana Barcenas  Date of admission:  7/30/2025  3:29 PM  MRN:   [id number]  YOB: 1945  Time of Consult:  10:16 AM    Consulting Cardiologist: Dr. Raj Layton, APRN, CNP  Primary Cardiologist:  Dr. Raj Garcia    Referring Provider: Dr Sue      Admission Diagnosis:     Shortness of breath on exertion      History of Present Illness:      Keyana Barcenas is a 80 y.o.  female patient who is being at the request of Dr. Sue for inpatient consultation of SOB. She was admitted on 7/30/2025.  Previous [facility] and [facility] records have been reviewed in detail.   Patient with a history of factor V on Coumadin, hypertension, GERD, neuropathy  Patient states the last 3 to 4 weeks she has had increased shortness of breath she states that if she walks 15 to 20 feet she gets short of breath then when she goes in breath the dyspnea on exertion goes away.  She states that she did not really feel like it was much difference but then she noticed that she would get some lightheadedness and dizziness and just felt she was a little concerned she told one of her grandchildren and when they brought him into see her regular doctor he recommended she goes to the emergency department get checked out.  They did a chest x-ray it was unremarkable her BNP was normal.  They did do an echo and her EF was 55 to 60% with some mild LVH.  Her main concern is shortness of breath with exertion  Denies chest pain, fever, chills, nausea, vomiting, PND, orthopnea, claudication  Positive for shortness of breath on exertion, dizziness at times    EKG is normal sinus rhythm no acute changes  D-dimer 242  INR 1.4  Sodium 140  Potassium 4.2  BUN 11  Creatinine 0.8  Troponin 11, 11    Cardiac history  CONCLUSIONS:   1. The left ventricular systolic function is normal with a visually estimated ejection fraction of 55-60%.   2.  Spectral Doppler shows a Grade I (impaired relaxation pattern) of left ventricular diastolic filling with normal left atrial filling pressure.   3. There is normal right ventricular global systolic function.   4. Normal sized right ventricle.   5. The left atrial size is mild to moderately dilated.   6. There is no evidence of mitral valve stenosis.   7. Mild mitral valve regurgitation.   8. Mild tricuspid regurgitation is visualized.   9. The Doppler estimated RVSP is within normal limits at 27 mmHg.  10. Aortic valve stenosis is not present. The peak and mean gradients are 7 mmHg and 4 mmHg respectively.  11. The main pulmonary artery is normal in size, and position, with normal bifurcation into the left and right pulmonary arteries.      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    Factor 5 Leiden mutation, heterozygous    Anxiety disorder, unspecified 03/28/2022    Anxiety    Cervicalgia     Neck pain    Deficiency of other specified B group vitamins 12/18/2016    Vitamin B12 deficiency    Disease due to severe acute respiratory syndrome coronavirus 2 (SARS-CoV-2) 02/03/2023    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 01/12/2021    Drug or chemical induced diabetes mellitus, controlled, with hyperglycemia    Gout, unspecified 05/04/2022    Gout    Personal history of other specified conditions 12/18/2016    History of fatigue    Personal history of other venous thrombosis and embolism 04/01/2019    History of deep venous thrombosis    Personal history of other venous thrombosis and embolism 12/18/2016    History of deep venous thrombosis    Personal history of urinary (tract) infections 12/09/2016    History of UTI       Past Surgical  History:     Past Surgical History:   Procedure Laterality Date    BLADDER SURGERY  12/18/2016    Bladder Surgery    CARDIAC CATHETERIZATION      CATARACT EXTRACTION  12/18/2016    Cataract Extraction    FL DECLOT BY THROMOBOLYTIC AGENT OF VAD OR CATHETER GUIDANCE Left     HYSTERECTOMY  12/18/2016    Hysterectomy    TONSILLECTOMY  12/18/2016    Tonsillectomy       Family History:     Family History   Problem Relation Name Age of Onset    Coronary artery disease Mother      Breast cancer Mother      Rheum arthritis Mother      Colon cancer Son      Colon cancer Maternal Grandmother         Social History:     Social History     Tobacco Use    Smoking status: Former     Current packs/day: 0.50     Average packs/day: 0.5 packs/day for 25.0 years (12.5 ttl pk-yrs)     Types: Cigarettes    Smokeless tobacco: Never   Substance Use Topics    Alcohol use: Not Currently    Drug use: Never       CURRENT INPATIENT MEDICATIONS    Scheduled Medications[1]  Continuous Medications[2]  Current Outpatient Medications   Medication Instructions    albuterol 90 mcg/actuation inhaler 2 puffs, inhalation, Every 6 hours PRN    allopurinol (ZYLOPRIM) 300 mg, oral, Daily    allopurinol (ZYLOPRIM) 300 mg, oral, Daily    cyanocobalamin (Vitamin B-12) 1,000 mcg tablet 1 tablet, Daily    folic acid (Folvite) 1 mg tablet 1 tablet, Daily    furosemide (LASIX) 20 mg, oral, Daily, As directed    furosemide (LASIX) 20 mg, oral, Daily    lisinopril 10 mg, oral, Daily    methocarbamol (ROBAXIN) 750 mg, oral, 3 times daily PRN    potassium chloride CR 10 mEq ER tablet 10 mEq, oral, Daily    warfarin (Coumadin) 4 mg tablet TAKE 1 TABLET BY MOUTH DAILY FOR 5 DAYS AND 1/2 TABLET DAILY 2 DAYS A WEEK        Review of Systems:      12 point review of systems was obtained in detail and is negative other than that detailed above.    Vital Signs:     Vitals:    07/30/25 1830 07/30/25 1911 07/31/25 0042 07/31/25 0726   BP: 157/65 170/70 141/63 (!) 128/93   BP  Location:   Left arm    Patient Position:   Lying    Pulse: 82 81 82 88   Resp: 17 20 18 16   Temp:  36.4 °C (97.5 °F) 36.8 °C (98.2 °F) 36.8 °C (98.2 °F)   TempSrc:   Temporal    SpO2: 96% 95% 92% 95%   Weight:       Height:           Intake/Output Summary (Last 24 hours) at 7/31/2025 [facility]  Last data filed at 7/31/2025 0042  Gross per 24 hour   Intake 810 ml   Output --   Net 810 ml       Wt Readings from Last 4 Encounters:   07/30/25 78.9 kg (174 lb)   07/30/25 78.9 kg (174 lb)   05/29/25 78.5 kg (173 lb)   04/18/25 79.8 kg (176 lb)       Physical Examination:     GENERAL APPEARANCE: Well developed, well nourished, in no acute distress.  CHEST: Symmetric and non-tender.  INTEGUMENT: Skin warm and dry, without gross excoriationis or lesions.  HEENT: No gross abnormalities of conjunctiva, teeth, gums, oral mucosa  NECK: Supple, no JVD, no bruit. Thyroid not palpable. Carotid upstrokes normal.  NEURO/PSHCY: Alert and oriented x3; appropriate behavior and responses and responses, grossly normal cerebellar function with normal balance and coordination  LUNGS: Clear to auscultation bilaterally; normal respiratory effort.  HEART: Rate and rhythm regular with no evident murmur; no gallop appreciated. There are no rubs, clicks or heaves. PMI nondisplaced.  ABDOMEN: Soft, nontender, no palpable hepatosplenomegaly, no mases, no bruits. Abdominal aorta not noted to be enlarged.  MUSCULOSKELETAL: Ambulatory with normal tandem gait.  EXTREMITIES: Warm with good color, no clubbing or cyanois. There is no edema noted.  PERIPHERAL VASCULAR: Pulses present and equally palpable; 1+ throughout. No femoral bruits.      Lab:     CBC:   Results from last 7 days   Lab Units 07/30/25  1602   WBC AUTO x10*3/uL 11.0   RBC AUTO x10*6/uL 4.66   HEMOGLOBIN g/dL 14.4   HEMATOCRIT % 44.3   MCV fL 95   MCH pg 30.9   MCHC g/dL 32.5   RDW % 14.4   PLATELETS AUTO x10*3/uL 238     CMP:    Results from last 7 days   Lab Units 07/30/25  1602   SODIUM  mmol/L 140   POTASSIUM mmol/L 4.2   CHLORIDE mmol/L 102   CO2 mmol/L 30   BUN mg/dL 11   CREATININE mg/dL 0.80   GLUCOSE mg/dL 98   PROTEIN TOTAL g/dL 7.2   CALCIUM mg/dL 9.2   BILIRUBIN TOTAL mg/dL 0.4   ALK PHOS U/L 74   AST U/L 17   ALT U/L 10     BMP:    Results from last 7 days   Lab Units 07/30/25  1602   SODIUM mmol/L 140   POTASSIUM mmol/L 4.2   CHLORIDE mmol/L 102   CO2 mmol/L 30   BUN mg/dL 11   CREATININE mg/dL 0.80   CALCIUM mg/dL 9.2   GLUCOSE mg/dL 98     Magnesium:  Results from last 7 days   Lab Units 07/30/25  1602   MAGNESIUM mg/dL 2.10     Troponin:    Results from last 7 days   Lab Units 07/30/25  1659 07/30/25  1602   TROPHS ng/L 11 11     BNP:   Results from last 7 days   Lab Units 07/30/25  1602   BNP pg/mL 52     Lipid Panel:         Diagnostic Studies:            [place] [address]   Tel [phone] Fax [phone]     TRANSTHORACIC ECHOCARDIOGRAM REPORT     Patient Name:       VELIA Mann Physician:    [id number] Raj Garcia DO  Study Date:         7/31/2025           Ordering Provider:    [id number] FRANDY MONROE  MRN/PID:            [id number]            Fellow:  Accession#:         [id number]        Nurse:                Sammy Quintero RN  Date of Birth/Age:  1945 / 80 years Sonographer:          Mary Mayorga RDCS  Gender Assigned at  F                   Additional Staff:  Birth:  Height:             149.86 cm           Admit Date:           7/30/2025  Weight:             78.93 kg            Admission Status:     Inpatient -                                                                Routine  BSA / BMI:          1.74 m2 / 35.14     Department Location:  [place] [address]                                     Echo  Lab  Blood Pressure: 141 /63 mmHg     Study Type:    TRANSTHORACIC ECHO (TTE) COMPLETE  Diagnosis/ICD: Chest pain, unspecified-R07.9; Dyspnea, unspecified-R06.00  Indication:    CP, SOB  CPT Codes:     Echo Complete w Full Doppler-[id number]     Patient History:  Smoker:            Former.  Pertinent History: HTN, Previous DVT, Dyspnea and Chest Pain.     Study Detail: The following Echo studies were performed: 2D, M-Mode, Doppler and                color flow. Definity used as a contrast agent for endocardial                border definition and agitated saline used as a contrast agent for                intraseptal flow evaluation. Total contrast used for this                procedure was 2 mL via IV push.        PHYSICIAN INTERPRETATION:  Left Ventricle: The left ventricular systolic function is normal with a visually estimated ejection fraction of 55-60%. There are no regional wall motion abnormalities. The left ventricular cavity size is normal. There is normal septal and normal posterior left ventricular wall thickness. Spectral Doppler shows a Grade I (impaired relaxation pattern) of left ventricular diastolic filling with normal left atrial filling pressure.  LV Wall Scoring:  All segments are normal.     Left Atrium: The left atrial size is mild to moderately dilated. A bubble study using agitated saline was performed. Bubble study is negative.  Right Ventricle: The right ventricle is normal in size. There is normal right ventricular global systolic function.  Right Atrium: The right atrial size is normal.  Aortic Valve: The aortic valve appears structurally normal. The aortic valve area by VTI is 1.60 cmï¿½ with a peak velocity of 1.29 m/s. The peak and mean gradients are 7 mmHg and 4 mmHg, respectively, with a dimensionless index of 0.61. There is no evidence of aortic valve stenosis.  There is no evidence of aortic valve regurgitation.  Mitral Valve: The mitral valve is normal in structure. There is no  evidence of mitral valve stenosis. There is normal mitral valve leaflet mobility. There is mild mitral valve regurgitation. The E Vmax is 0.65 m/s.  Tricuspid Valve: The tricuspid valve is structurally normal. There is normal tricuspid valve leaflet mobility. There is mild tricuspid regurgitation. The Doppler estimated right ventricular systolic pressure (RVSP) is within normal limits at 27 mmHg.  Pulmonic Valve: The pulmonic valve is structurally normal. There is no indication of pulmonic valve regurgitation.  Pericardium: No pericardial effusion noted.  Aorta: The aortic root is normal.  Pulmonary Artery: The main pulmonary artery is normal in size, and position, with normal bifurcation into the left and right pulmonary arteries.  Systemic Veins: The inferior vena cava appears normal in size, with IVC inspiratory collapse greater than 50%.  In comparison to the previous echocardiogram(s): There are no prior studies on this patient for comparison purposes.        CONCLUSIONS:   1. The left ventricular systolic function is normal with a visually estimated ejection fraction of 55-60%.   2. Spectral Doppler shows a Grade I (impaired relaxation pattern) of left ventricular diastolic filling with normal left atrial filling pressure.   3. There is normal right ventricular global systolic function.   4. Normal sized right ventricle.   5. The left atrial size is mild to moderately dilated.   6. There is no evidence of mitral valve stenosis.   7. Mild mitral valve regurgitation.   8. Mild tricuspid regurgitation is visualized.   9. The Doppler estimated RVSP is within normal limits at 27 mmHg.  10. Aortic valve stenosis is not present. The peak and mean gradients are 7 mmHg and 4 mmHg respectively.  11. The main pulmonary artery is normal in size, and position, with normal bifurcation into the left and right pulmonary arteries.     RECOMMENDATIONS:  Transthoracic echo has low negative predictive value for mass, vegetation,  or embolic source. Consider ODELL or MRI if clinically indicated.         EKG  Normal sinus rhythm  Left anterior fascicular block  Abnormal ECG  When compared with ECG of 30-JUL-2025 17:12, (unconfirmed)  No significant change was found    V  Radiology:     Transthoracic Echo Complete   Final Result      XR chest 1 view   Final Result   No evidence of acute intrathoracic abnormality.        Signed by: Kavon Guevara 7/30/2025 4:31 PM   Dictation workstation:   [id number]          Problem List:     Patient Active Problem List   Diagnosis    Anxiety    Arthritis    Bilateral sensorineural hearing loss    Blocked ear    Class 1 obesity with body mass index (BMI) of 34.0 to 34.9 in adult    Depression with anxiety    Dizziness    Edema    Elevated lipids    Endolymphatic hydrops    Essential hypertension    Factor V Leiden (Multi)    Foot pain    Gait instability    GERD (gastroesophageal reflux disease)    Gout    Hyperuricemia    Hypokalemia    Imbalance    Ingrown toenail    Leg swelling    Neuropathy, leg    Otitis externa    Palindromic rheumatism    Urinary tract infection symptoms    Vitamin D deficiency    Vertigo    Abdominal pain, acute    Acute bronchitis    Acute sinusitis    Recurrent deep vein thrombosis (DVT) (Multi)    Pedal edema    Hereditary deficiency of other clotting factors    Closed fracture of multiple ribs of right side with routine healing    Medicare annual wellness visit, subsequent    Shortness of breath on exertion       Assessment:   80-year-old female seen and evaluated at the bedside in conjunction with Jose Smith RN, CNP in the telemetry unit.    Bedside examination evaluation and interview were performed.    Chart review details cussed the patient and staff and attending physician.    Impression:  Dyspnea on exertion  History of factor V on Coumadin  Hypertension  History of DVT  EF 55 to 60%, current echo unremarkable    Plan:   Tele monitoring  Serial enzymes negative  2D echo  done EF 55 to 60%  D-dimer negative  Daily EKGs  Okay to continue the Coumadin  Aspirin 81 mg daily  Lasix 20 mg p.o. daily  Lipitor 40 mg daily  Lisinopril 10 mg daily  Keep magnesium greater than 2.0  PT/OT evaluation and treatment    Discussion:  This is a 80-year-old female presenting to the hospital with dyspnea on exertion.  Patient has factor V deficiency is on chronic anticoagulation.  She has history of hypertension deep vein thrombosis and other various medical conditions as outlined above.  Cardiology was asked to see her.  Patient's echocardiogram today shows completely normal LV function with no remarkable findings.  EKGs are unremarkable as well.  Biomarkers are negative.  No indication of significant cardiac decompensation or other cardiovascular factors are identified at this point.  Will observe over 24 hours.  Will do outpatient follow-up since we have seen her before in the office.  Review any outside records and/or other additional information and comments tomorrow.  General medicine will address other medical matters at this time.      Raj Garcia DO,[facility]      Jose Layton [facility]      Of note, this documentation is completed using the Dragon Dictation system (voice recognition software). There may be spelling and/or grammatical errors that were not corrected prior to final submission.      Electronically signed by Raj Garcia DO, on 7/31/2025 at 10:16 AM         [1] allopurinol, 300 mg, oral, Daily  aspirin, 81 mg, oral, Daily  atorvastatin, 40 mg, oral, Nightly  furosemide, 20 mg, oral, Daily  lisinopril, 10 mg, oral, Daily  polyethylene glycol, 17 g, oral, Daily  potassium chloride CR, 10 mEq, oral, Daily  warfarin, 4 mg, oral, Daily     [2]

## 2025-07-31 NOTE — CARE PLAN
"The patient's goals for the shift include \"figuring why I am short of breath\".    The clinical goals for the shift include Pt will have downtrending of troponins by repeat labs tomorrow.      "

## 2025-07-31 NOTE — PROGRESS NOTES
Physical Therapy    Physical Therapy    Physical Therapy Evaluation    Patient Name: Keyana Barcenas  MRN: [id number]  Today's Date: 7/31/2025   Time Calculation  Start Time: 0906  Stop Time: 0922  Time Calculation (min): 16 min  918/918-A    Assessment/Plan   PT Assessment  Barriers to Discharge Home: No anticipated barriers  Evaluation/Treatment Tolerance: Patient tolerated treatment well  End of Session Communication: Bedside nurse, Care Coordinator  Assessment Comment: no further PT Recommend use of ww for ambulation  End of Session Patient Position: Bed, 2 rail up, Alarm off, not on at start of session  IP OR SWING BED PT PLAN  Inpatient or Swing Bed: Inpatient  PT Plan  PT Plan: PT Eval only  PT Eval Only Reason: No acute PT needs identified  PT Discharge Recommendations: No PT needed after discharge  Equipment Recommended upon Discharge: Wheeled walker  PT Recommended Transfer Status: Independent  PT - OK to Discharge:(once deemed medically appropriate)    Subjective     General Visit Information:  General  Reason for Referral: Impaired mobility  Referred By: PT/OT 7/30/25 [place]  Past Medical History Relevant to Rehab: Aarthritis, Depression, Anxiety, GERD, Gout, DVT, Hyster, PCI, Bladder surgery, Coumadin, Bilateral SNHL, obesity, vertigo, factor V leiden  Co-Treatment: OT  Co-Treatment Reason: to maximize patient and staff safety  Patient Position Received: Bed, 2 rail up, Alarm off, not on at start of session  General Comment: To ED 7/30/225 for chest pain and dyspnea. Was at PCP and sent over due to increased BP. and chest aching. Flu A/B (-); C19 (-0; BNP 52; Troponin 11    Home Living:  Home Living  Home Living Comments: Per patient report resides alone in 2 story home with basement.  Laundry on 1st floor. Bed/bath on 1st floor Walk in shower with seat and grab bar. Raised toilet. owns BSC. cane, rollator. Fearful of rollator    Prior Level of Function:  Prior Function Per Pt/Caregiver  Report  Prior Function Comments: Per patient report independent in mobilty, ,ADLs and IADLs without assistive device. Does admit to tripping over cat and her feet, drags LLE since C19. Does not drive.    Precautions:  Precautions  Medical Precautions: Fall precautions    Vital Signs:  Pre activity 98% SpO2 84 HR RA  Post activity 97% Spo2 95 HR RA   Objective     Pain:  Pain Assessment  Pain Assessment: 0-10  0-10 (Numeric) Pain Score: 0 - No pain    Cognition:  Cognition  Overall Cognitive Status:  (alert cooperative, talkative, tangental)  Orientation Level: Oriented X4    General Assessments:  General Observation  General Observation: Patient lying in bed. Agreeable to PT/OT   Activity Tolerance  Endurance:  (Good although patient does report SOB with ambulation, however saurations 97% HR 95)  Sensation  Light Touch: No apparent deficits              Static Sitting Balance: Good  Dynamic Sitting Balance: Good  Static Standing Balance: Good  Dynamic Standing Balance: Fair +    Functional Assessments:     Bed Mobility  Bed Mobility:  (Supine <> sit independent)  Transfers  Transfer:  (Sit <> stand at bed level independent)  Ambulation/Gait Training  Ambulation/Gait Training Performed:  (Patient ambulated 45' without assistive device, note decreased foot clearance with guarded gait SBA. Provided ww for ambulation, patient able to ambulate another 100' modified independent. Note improved foot clearance and more fluid gait. Discussed recommendations of continued use of ww. Patient acknowledges that she feels better using the ww.         Extremity/Trunk Assessments:  RUE   RUE : Within Functional Limits  LUE   LUE: Within Functional Limits  RLE   RLE :  (AROM Hip/knee/ankle WFL MMT 5/5 grossly)  LLE   LLE :  (AROM Hip/knee/ankle WFL MMT 5/5 grossly)    Outcome Measures:     [facility] Basic Mobility  Turning from your back to your side while in a flat bed without using bedrails: None  Moving from lying on your back to  sitting on the side of a flat bed without using bedrails: None  Moving to and from bed to chair (including a wheelchair): None  Standing up from a chair using your arms (e.g. wheelchair or bedside chair): None  To walk in hospital room: None  Climbing 3-5 steps with railing: None  Basic Mobility - Total Score: 24        Education Documentation  Mobility Training, taught by Clover Esquivel PT at 7/31/2025 11:29 AM.  Learner: Patient  Readiness: Acceptance  Method: Explanation, Demonstration  Response: Verbalizes Understanding, Demonstrated Understanding  Comment: use of ww for ambulation

## 2025-07-31 NOTE — CARE PLAN
The patient's goals for the shift include cardiac monitoring    The clinical goals for the shift include cardiology consult and monitoring    Over the shift, the patient did not make progress toward the following goals. Barriers to progression include . Recommendations to address these barriers include .

## 2025-07-31 NOTE — PROGRESS NOTES
07/31/25 1204   Discharge Planning   Living Arrangements Alone   Support Systems Children;Family members   Assistance Needed PTA - lives alone in 2 story home. Bed/bath/laundry all on main level. Has a walk in shower with seat and grab bar. At baseline - reports being independent for ambulation, ADLs, and IADLs. Has not been using any AD. Owns rollator, cane, and BSC. Raised toilet. Family provides transportation.   Type of Residence Private residence   Do you have animals or pets at home? Yes   Home or Post Acute Services None   Expected Discharge Disposition Home   Stroke Family Assessment   Stroke Family Assessment Needed No   Intensity of Service   Intensity of Service 0-30 min     PT/OT [facility] 24/24, no further therapy needs at dc. They did recommend a wheeled walker at dc as pt reported she was fearful of using her rollator at home. Yakov script signed by MD and therapy to assist with supplying pt with walker before dc. HOME no other needs.

## 2025-07-31 NOTE — PROGRESS NOTES
Physical Therapy                 Therapy Communication Note    Patient Name: Keyana Barcenas  MRN: [id number]  Department: ADA [facility] NONV1  Room: [address] [place], [place] [address]  Today's Date: 7/31/2025 740    Discipline: Physical Therapy    Comment: Patient leaving floor for echocardiogram. To re -attempt as apppropriate

## 2025-07-31 NOTE — DISCHARGE SUMMARY
Discharge Diagnosis  Shortness of breath on exertion           Issues Requiring Follow-Up  Follow up with PCP in 1-2 weeks     Discharge Meds     Medication List      START taking these medications     metoprolol succinate XL 25 mg 24 hr tablet; Commonly known as:   Toprol-XL; Take 1 tablet (25 mg) by mouth once daily. Do not crush or   chew.     CHANGE how you take these medications     furosemide 20 mg tablet; Commonly known as: Lasix; Take 1 tablet (20 mg)   by mouth once daily.; What changed: Another medication with the same name   was removed. Continue taking this medication, and follow the directions   you see here.     CONTINUE taking these medications     albuterol 90 mcg/actuation inhaler; Inhale 2 puffs every 6 hours if   needed for wheezing.   * allopurinol 300 mg tablet; Commonly known as: Zyloprim; Take 1 tablet   (300 mg) by mouth once daily.   * allopurinol 300 mg tablet; Commonly known as: Zyloprim; Take 1 tablet   (300 mg) by mouth once daily.   cyanocobalamin 1,000 mcg tablet; Commonly known as: Vitamin B-12   folic acid 1 mg tablet; Commonly known as: Folvite   lisinopril 10 mg tablet; Take 1 tablet (10 mg) by mouth once daily.   methocarbamol 750 mg tablet; Commonly known as: Robaxin; Take 1 tablet   (750 mg) by mouth 3 times a day as needed for muscle spasms.   potassium chloride CR 10 mEq ER tablet; Commonly known as: Klor-Con;   Take 1 tablet (10 mEq) by mouth once daily.   warfarin 4 mg tablet; Commonly known as: Coumadin  * This list has 2 medication(s) that are the same as other medications   prescribed for you. Read the directions carefully, and ask your doctor or   other care provider to review them with you.       Test Results Pending At Discharge  Pending Labs       No current pending labs.            Hospital Course     Keyana Barcenas is a 80 y.o. female with past medical history of hypertension, DVTs factor V Leiden on Coumadin, gout, GERD, neuropathy, vertigo comes into  the hospital due to shortness of breath that has been ongoing for past 3 weeks.  The patient saw her PCP and was referred to the hospital as her shortness of breath is significant and at times is at rest.  She also reports chest pressure at times.  She states she had chest pain a few days ago as well.  The patient did receive nitroglycerin in ED but she reports she still has some chest pressure.  She denies any chest pain.  She denies any dizziness lightheadedness but she reports palpitations at times.  The patient has been compliant with her Coumadin and states her INR has been stable on Coumadin.  In ED troponins were within normal limits and chest x-ray was unremarkable.  The patient is admitted for further care and management     Acute hospital for 23-hour observation with cardiac telemetry.  Troponins are negative. EKG shows no acute ST changes.  Cardiology was consulted.  Echocardiogram normal LV function.  Toprol-XL 25 mg was added.  Patient discharged home stable condition.  Follow-up with primary physician 1 to 2 weeks.  Follow-up with cardiology as outpatient in 2 to 4 weeks.    Pertinent Physical Exam At Time of Discharge  Physical Exam  Constitutional:       Appearance: Normal appearance.   HENT:      Head: Normocephalic and atraumatic.      Nose: Nose normal.      Mouth/Throat:      Mouth: Mucous membranes are dry.      Eyes:      Extraocular Movements: Extraocular movements intact.      Conjunctiva/sclera: Conjunctivae normal.         Cardiovascular:      Rate and Rhythm: Normal rate and regular rhythm.      Pulses: Normal pulses.      Heart sounds: Normal heart sounds.   Pulmonary:      Effort: Pulmonary effort is normal.      Breath sounds: Normal breath sounds.   Abdominal:      General: Bowel sounds are normal.      Palpations: Abdomen is soft.      Musculoskeletal:         General: Normal range of motion.      Cervical back: Normal range of motion and neck supple.      Right lower leg: Edema  present.      Left lower leg: Edema present.      Skin:     General: Skin is warm and dry.      Neurological:      General: No focal deficit present.      Mental Status: She is alert and oriented to person, place, and time. Mental status is at baseline.      Psychiatric:         Mood and Affect: Mood normal.        Outpatient Follow-Up  Future Appointments   Date Time Provider Department Center   8/11/2025  2:45 PM Raj Garcia DO [id number] [place]   10/17/2025  9:20 AM Marj Weston MD [id number] [place]     Discharge time >30 min       Andrea Sue MD

## 2025-07-31 NOTE — NURSING NOTE
AVS printed and reviewed with patient. All belonging sent with patient. New medication discussed with patient.IV removed cath intact/tele off

## 2025-08-01 ENCOUNTER — PATIENT OUTREACH (OUTPATIENT)
Dept: PRIMARY CARE | Facility: CLINIC | Age: 80
End: 2025-08-01
Payer: MEDICARE

## 2025-08-01 NOTE — PROGRESS NOTES
Date of Service: 11/26/18  Â   Facility: [facility]    Chief Complaint:  Nursing Home    HPI:  Nithin Carson is a 67year old female who is being seen for routine follow up. She was re-ad[facility] the [facility] on [facility]/14[facility]18 for subacute rehabilitation following hospitalization for a non-traumatic L2 compression fracture which is being managed conservatively through Dr. Maciel Gordon in orthopedics. She is sitting in a high back wheelchair. She is not wearing the TLSO brace and pain is controlled on current medications. She is working with speech therapy for oropharyngeal dysphagia. She is currently on a pureed diet. Speech therapy has approved her to have the corn puffs that she likes. Her  (activated [facility]) signed a waiver for her to receive mechanical soft fruit when he is present. Goals of care are to continue therapy with the home that she would be able to return home. The patient and her  are not interested in hospice at this time.       Patient Active Problem List   Diagnosis   â¢ Basal cell carcinoma of eyelid   â¢ Multiple sclerosis (CMS/[facility])   â¢ DM w/o complication type II   â¢ DVT (deep venous thrombosis) (CMS/[facility])   â¢ Long term (current) use of anticoagulants   â¢ Depressive disorder, not elsewhere classified   â¢ Decubitus ulcer of coccyx   â¢ Osteoporosis   â¢ Other and unspecified hyperlipidemia   â¢ Atonic neurogenic bladders   â¢ Chronic kidney disease, stage III (moderate) (CMS/[facility])   â¢ Pressure ulcer   â¢ Generalized weakness   â¢ Diarrhea   â¢ Acute UTI   â¢ DM (diabetes mellitus) type II controlled with renal manifestation (CMS/[facility])   â¢ Hypertensive renal disease   â¢ Candidal dermatitis   â¢ Incontinence associated dermatitis   â¢ Ischemic stroke (CMS/[facility])   â¢ Left carotid stenosis   â¢ 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       Past Surgical History:   Procedure Laterality Date   â¢ Appendectomy     â¢ Esophagogastroduodenoscopy transoral flex w/bx single or mult [place]  Discharge Diagnosis:Shortness of breath on exertion   Admission Date:7/30/2025  Discharge Date: 7/31/2025    PCP Appointment Date:8/18/2025, past 14 days, soonest available  Specialist Appointment Date:   8/11/2025 Cardio/[facility]  Hospital Encounter and Summary Linked: Yes  ED to Hosp-Admission (Discharged) with Andrea Sue MD; Raghavendra Espino MD (07/30/2025)     See discharge assessment below for further details  Wrap Up  Wrap Up Additional Comments: -- (Keyana states she is doing better but still having some SOB. She will monitor her BP and report any concerning sxs to provider.) (8/1/2025  1:28 PM)    Engagement  Call Start Time: 1317 (8/1/2025  1:28 PM)    Medications  Medications reviewed with patient/caregiver?: Yes (8/1/2025  1:28 PM)  Is the patient having any side effects they believe may be caused by any medication additions or changes?: No (8/1/2025  1:28 PM)  Does the patient have all medications ordered at discharge?: Yes (8/1/2025  1:28 PM)  Care Management Interventions: Provided patient education (8/1/2025  1:28 PM)  Prescription Comments: New: Metoprolol Succinate XL 25 mg qd (8/1/2025  1:28 PM)  Is the patient taking all medications as directed (includes completed medication regime)?: Yes (8/1/2025  1:28 PM)  Care Management Interventions: Provided patient education (8/1/2025  1:28 PM)  Medication Comments: -- (Keyana verbalized understanding of medication) (8/1/2025  1:28 PM)    Appointments  Does the patient have a primary care provider?: Yes (8/1/2025  1:28 PM)  Care Management Interventions: Verified appointment date/time/provider (8/18/2025, no sooner available Will see cardio 8/11/2025) (8/1/2025  1:28 PM)  Has the patient kept scheduled appointments due by today?: Yes (8/1/2025  1:28 PM)    Self Management  What is the home health agency?: -- (na) (8/1/2025  1:28 PM)  What Durable Medical Equipment (DME) was ordered?: -- (Keyana is using walker for  11/11/14    Dr Van Shore   â¢ Esophagogastroduodenoscopy transoral flex w/bx single or mult  01/29/2018    inpt-gastritis,neg hpylori, [id number]   â¢ Hysterectomy     â¢ Removal gallbladder         ALLERGIES:   Allergen Reactions   â¢ Fosamax PRURITUS   â¢ Tizanidine Other (See Comments)     Severe bradycardia   â¢ Bactrim [Sulfamethoxazole W/Trimethoprim] Other (See Comments)     Acute kidney injury   â¢ Protonix PRURITUS     Per pt. report       Current Outpatient Medications   Medication Sig Dispense Refill   â¢ HYDROcodone-acetaminophen (NORCO) 5-325 MG per tablet Take one tablet PO every morning. Can also take 1 tablet PO every every 4 hours as needed for pain. 100 tablet 0   â¢ docusate sodium-sennosides (SENOKOT S) 50-8.6 MG per tablet Take 1 tablet by mouth 2 times daily. 8 AM and 8 PM. Hold if loose stools. 30 tablet 11   â¢ sodium biphosphate (FLEET) 7-19 GM/118ML enema Place 1 enema rectally daily as needed for Constipation. â¢ acetaminophen (TYLENOL) 650 MG suppository Place 1 suppository rectally daily as needed for Fever or Pain. 1 each 0   â¢ insulin regular 70-30 (HUMULIN 70/30) (70-30) 100 UNIT/ML injectable suspension Inject into the skin 2 times daily (before meals). 50 units in AM and 35 units in PM, same parameters     â¢ bisacodyl (DULCOLAX) 10 MG suppository Place 1 suppository rectally daily as needed for Constipation. 1 each 0   â¢ acetaminophen (TYLENOL) 325 MG tablet Take 2 tablets by mouth every 4 hours as needed for Pain or Fever. 1 tablet 0   â¢ ferrous sulfate 325 (65 FE) MG tablet Take 1 tablet by mouth daily (with breakfast). 30 tablet 11   â¢ polyethylene glycol (GLYCOLAX, MIRALAX) packet Take 17 g by mouth daily as needed (constipation). 30 each 0   â¢ atorvastatin (LIPITOR) 80 MG tablet Take 1 tablet by mouth daily.  90 tablet 0   â¢ aspirin (ECOTRIN) 325 MG EC tablet TAKE 1 TABLET BY MOUTH EVERY DAY **OTC NOT COVERED** 30 tablet 1   â¢ guaiFENesin (MUCINEX) 600 MG 12 hr tablet Take 1 tablet mobility and no longer drives) (8/1/2025  1:28 PM)    Patient Teaching  Does the patient have access to their discharge instructions?: Yes (8/1/2025  1:28 PM)  Care Management Interventions: Reviewed instructions with patient (8/1/2025  1:28 PM)  What is the patient's perception of their health status since discharge?: Improving (8/1/2025  1:28 PM)  Is the patient/caregiver able to teach back the hierarchy of who to call/visit for symptoms/problems? PCP, Specialist, Home Health nurse, Urgent Care, ED, 911: Yes (8/1/2025  1:28 PM)         by mouth 2 times daily as needed for Congestion. 1 tablet 0   â¢ escitalopram (LEXAPRO) 5 MG tablet Take 1 tablet by mouth daily. 1 tablet 0   â¢ magnesium hydroxide (MILK OF MAGNESIA) 400 MG/5ML suspension Take 30 mLs by mouth daily as needed for Constipation. 360 mL 0   â¢ hydrALAZINE (APRESOLINE) 50 MG tablet TAKE 1 TABLET BY MOUTH 3 TIMES DAILY. 270 tablet 0   â¢ omeprazole (PRILOSEC) 20 MG capsule TAKE 1 CAPSULE BY MOUTH DAILY. 90 capsule 0   â¢ Psyllium (METAMUCIL FIBER PO) Take by mouth daily as needed. â¢ amLODIPine (NORVASC) 10 MG tablet Take 1 tablet by mouth daily. 90 tablet 0   â¢ Cholecalciferol (VITAMIN D) 2000 UNITS Cap Take 1 capsule by mouth daily. â¢ calcium carbonate-vitamin D (CALCIUM 600 + D) 600-400 MG-UNIT per tablet Take 1 tablet by mouth 2 times daily. Takes at [place]     â¢ vitamin - therapeutic multivitamins w/minerals (CENTRUM SILVER,THERA-M) TABS Take 1 tablet by mouth daily. 0     No current facility-administered medications for this visit.         Family History   Problem Relation Age of Onset   â¢ Cancer Mother    â¢ Diabetes Father    â¢ Stroke Sister    â¢ Stroke Brother    â¢ Heart disease Neg Hx    â¢ Hypertension Neg Hx    â¢ High cholesterol Neg Hx    â¢ Clotting Disorder Neg Hx        Social History     Socioeconomic History   â¢ Marital status: /Civil Union     Spouse name: Not on file   â¢ Number of children: Not on file   â¢ Years of education: Not on file   â¢ Highest education level: Not on file   Social Needs   â¢ Financial resource strain: Not on file   â¢ Food insecurity - worry: Not on file   â¢ Food insecurity - inability: Not on file   â¢ Transportation needs - medical: Not on file   â¢ Transportation needs - non-medical: Not on file   Occupational History   â¢ Not on file   Tobacco Use   â¢ Smoking status: Never Smoker   â¢ Smokeless tobacco: Never Used   Substance and Sexual Activity   â¢ Alcohol use: No     Alcohol/week: 0.0 oz   â¢ Drug use: No   â¢ Sexual activity: Not on file   Other Topics Concern   â¢ Not on file   Social History Narrative   â¢ Not on file       Review of Systems   Constitutional: Negative for chills, fever and weight loss. HENT: Negative for hearing loss. Positive for difficulty swallowing. Eyes: Negative for pain, discharge and redness. Positive for chronic visual loss in left eye. Respiratory: Negative for cough, shortness of breath and wheezing. Cardiovascular: Negative for chest pain, orthopnea and leg swelling. Gastrointestinal: Negative for abdominal pain, blood in stool, constipation, diarrhea, heartburn, melena, nausea and vomiting. Genitourinary:        Positive for indwelling diaz catheter. Musculoskeletal: Positive for back pain. Skin: Negative for rash. Neurological: Positive for focal weakness (left side (chronic), right hand (chronic)). Negative for dizziness, sensory change and headaches. Psychiatric/Behavioral: Positive for depression and memory loss. Negative for suicidal ideas. Vitals:    11/18/18 1213   Weight: 75.1 kg     Physical Exam   Constitutional: She is oriented to person, place, and time and well-developed, well-nourished, and in no distress. Lying in bed comfortably. Does not have TLSO brace on. HENT:   Head: Normocephalic and atraumatic. Eyes: Right eye exhibits no discharge. Left eye exhibits no discharge. Right conjunctiva is not injected. Left conjunctiva is not injected. Cardiovascular: Normal rate, regular rhythm and normal heart sounds. Pulmonary/Chest: Effort normal and breath sounds normal.   Abdominal: Soft. Bowel sounds are normal. She exhibits no distension. There is no tenderness. Genitourinary:   Genitourinary Comments: Indwelling diaz catheter in place with urine in diaz bag yellow and clear. Musculoskeletal: She exhibits edema (trace in hands and ankles). Neurological: She is alert and oriented to person, place, and time. She has normal sensation.    Strength: 4/5 in upper extremities and right lower extremity. 2/5 in left lower extremity. Skin: Skin is warm and dry. Psychiatric: Mood and affect normal.       Assessment/Plan:   Non-traumatic compression fracture of L2 lumbar vertebra with delayed healing, subsequent encounter    Oropharyngeal dysphagia    Multiple sclerosis (CMS/HCC)    Cerebrovascular disease    Right hand weakness    Visual loss, left eye    Left-sided weakness    Left carotid stenosis    Chronic anemia    Diabetes mellitus type 2, insulin dependent (CMS/HCC)    Essential hypertension    CKD (chronic kidney disease), stage III (CMS/HCC)    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    Depressive disorder    Chronic midline low back pain without sciatica    Osteoporosis, post-menopausal    Chronic indwelling Diaz catheter      Liz Coleman is a 67year old female who was re-admitted to the [facility] on 5/14/18 for subacute rehabilitation following hospitalization for an L2 compression fracture. L2 compression fracture: She is sitting up in a high back wheelchair. Pain is controlled on current medications. Continue therapy. She has follow up with Dr. Aurelio Dumont in January. Oropharyngeal dysphagia: Continue working with ST.  Currently on pureed diet and  (activated [facility]) signed a waiver for her to have mechanical soft fruit when he is present. Other chronic medical conditions stable.  -History of prior strokes with residual right hand weakness and numbness, left sided weakness, and left eye visual loss / Left carotid stenosis / Hypertension / CKD 3: Stable. Blood pressure controlled. Continue aspirin, antihypertensive medications, and statin. Patient and [facility] do not want surgical intervention for her left carotid stenosis. -Diabetes: Continue NPH insulin and monitoring blood sugars regularly. Will recheck A1C.   -Chronic indwelling diaz catheter: Continue monthly catheter changes.  -Chronic anemia: Stable. Continue iron supplement.  -Osteoporosis: Continue calcium and vitamin D.  -Depression: Stable on Lexapro. Code status: DNR with [facility]. [facility]    Follow-up in 60 days and as needed.     Clemente Mcbride MD  Internal Medicine  [address]  [phone]    CC: Dhruv Stanley MD

## 2025-08-02 LAB
ATRIAL RATE: 87 BPM
P AXIS: 0 DEGREES
P OFFSET: 192 MS
P ONSET: 154 MS
PR INTERVAL: 118 MS
Q ONSET: 213 MS
QRS COUNT: 14 BEATS
QRS DURATION: 82 MS
QT INTERVAL: 388 MS
QTC CALCULATION(BAZETT): 466 MS
QTC FREDERICIA: 439 MS
R AXIS: -54 DEGREES
T AXIS: 64 DEGREES
T OFFSET: 407 MS
VENTRICULAR RATE: 87 BPM

## 2025-08-04 LAB
ATRIAL RATE: 80 BPM
ATRIAL RATE: 90 BPM
P AXIS: 105 DEGREES
P AXIS: 50 DEGREES
P OFFSET: 175 MS
P OFFSET: 194 MS
P ONSET: 145 MS
P ONSET: 171 MS
PR INTERVAL: 144 MS
PR INTERVAL: 88 MS
Q ONSET: 215 MS
Q ONSET: 217 MS
QRS COUNT: 13 BEATS
QRS COUNT: 14 BEATS
QRS DURATION: 78 MS
QRS DURATION: 82 MS
QT INTERVAL: 376 MS
QT INTERVAL: 398 MS
QTC CALCULATION(BAZETT): 459 MS
QTC CALCULATION(BAZETT): 459 MS
QTC FREDERICIA: 430 MS
QTC FREDERICIA: 438 MS
R AXIS: -48 DEGREES
R AXIS: -51 DEGREES
T AXIS: 44 DEGREES
T AXIS: 45 DEGREES
T OFFSET: 405 MS
T OFFSET: 414 MS
VENTRICULAR RATE: 80 BPM
VENTRICULAR RATE: 90 BPM

## 2025-08-11 ENCOUNTER — APPOINTMENT (OUTPATIENT)
Dept: CARDIOLOGY | Facility: CLINIC | Age: 80
End: 2025-08-11
Payer: MEDICARE

## 2025-08-11 VITALS
BODY MASS INDEX: 34.64 KG/M2 | SYSTOLIC BLOOD PRESSURE: 134 MMHG | WEIGHT: 171.8 LBS | HEART RATE: 68 BPM | DIASTOLIC BLOOD PRESSURE: 72 MMHG | HEIGHT: 59 IN

## 2025-08-11 DIAGNOSIS — R06.02 SHORTNESS OF BREATH ON EXERTION: ICD-10-CM

## 2025-08-11 DIAGNOSIS — R60.9 EDEMA, UNSPECIFIED TYPE: ICD-10-CM

## 2025-08-11 DIAGNOSIS — I10 ESSENTIAL HYPERTENSION: ICD-10-CM

## 2025-08-11 DIAGNOSIS — R07.89 OTHER CHEST PAIN: ICD-10-CM

## 2025-08-11 PROCEDURE — 3075F SYST BP GE 130 - 139MM HG: CPT | Performed by: INTERNAL MEDICINE

## 2025-08-11 PROCEDURE — 1159F MED LIST DOCD IN RCRD: CPT | Performed by: INTERNAL MEDICINE

## 2025-08-11 PROCEDURE — 3078F DIAST BP <80 MM HG: CPT | Performed by: INTERNAL MEDICINE

## 2025-08-11 PROCEDURE — 99495 TRANSJ CARE MGMT MOD F2F 14D: CPT | Performed by: INTERNAL MEDICINE

## 2025-08-11 RX ORDER — ISOSORBIDE MONONITRATE 60 MG/1
60 TABLET, EXTENDED RELEASE ORAL DAILY
Qty: 90 TABLET | Refills: 3 | Status: SHIPPED | OUTPATIENT
Start: 2025-08-11 | End: 2026-08-11

## 2025-08-11 RX ORDER — PREDNISONE 10 MG/1
TABLET ORAL
Qty: 6 TABLET | Refills: 0 | Status: SHIPPED | OUTPATIENT
Start: 2025-08-11 | End: 2025-08-21

## 2025-08-15 ENCOUNTER — PATIENT OUTREACH (OUTPATIENT)
Dept: PRIMARY CARE | Facility: CLINIC | Age: 80
End: 2025-08-15
Payer: MEDICARE

## 2025-08-18 ENCOUNTER — APPOINTMENT (OUTPATIENT)
Dept: PRIMARY CARE | Facility: CLINIC | Age: 80
End: 2025-08-18
Payer: MEDICARE

## 2025-08-18 VITALS
TEMPERATURE: 98.7 F | HEIGHT: 59 IN | WEIGHT: 172 LBS | OXYGEN SATURATION: 99 % | BODY MASS INDEX: 34.68 KG/M2 | DIASTOLIC BLOOD PRESSURE: 86 MMHG | SYSTOLIC BLOOD PRESSURE: 132 MMHG | HEART RATE: 74 BPM

## 2025-08-18 DIAGNOSIS — I82.409 RECURRENT DEEP VEIN THROMBOSIS (DVT) (MULTI): ICD-10-CM

## 2025-08-18 DIAGNOSIS — I48.91 ATRIAL FIBRILLATION, UNSPECIFIED TYPE (MULTI): Primary | ICD-10-CM

## 2025-08-18 DIAGNOSIS — I10 ESSENTIAL HYPERTENSION: ICD-10-CM

## 2025-08-18 DIAGNOSIS — D68.2 HEREDITARY DEFICIENCY OF OTHER CLOTTING FACTORS: ICD-10-CM

## 2025-08-18 DIAGNOSIS — R06.00 DYSPNEA, UNSPECIFIED: ICD-10-CM

## 2025-08-18 PROCEDURE — 1160F RVW MEDS BY RX/DR IN RCRD: CPT | Performed by: INTERNAL MEDICINE

## 2025-08-18 PROCEDURE — 1159F MED LIST DOCD IN RCRD: CPT | Performed by: INTERNAL MEDICINE

## 2025-08-18 PROCEDURE — 3075F SYST BP GE 130 - 139MM HG: CPT | Performed by: INTERNAL MEDICINE

## 2025-08-18 PROCEDURE — 99214 OFFICE O/P EST MOD 30 MIN: CPT | Performed by: INTERNAL MEDICINE

## 2025-08-18 PROCEDURE — G2211 COMPLEX E/M VISIT ADD ON: HCPCS | Performed by: INTERNAL MEDICINE

## 2025-08-18 PROCEDURE — 3079F DIAST BP 80-89 MM HG: CPT | Performed by: INTERNAL MEDICINE

## 2025-08-18 RX ORDER — METOPROLOL SUCCINATE 25 MG/1
25 TABLET, EXTENDED RELEASE ORAL DAILY
Qty: 90 TABLET | Refills: 1 | Status: SHIPPED | OUTPATIENT
Start: 2025-08-18 | End: 2026-08-18

## 2025-08-18 ASSESSMENT — ENCOUNTER SYMPTOMS
CONSTITUTIONAL NEGATIVE: 1
EYES NEGATIVE: 1
RESPIRATORY NEGATIVE: 1

## 2025-08-21 ENCOUNTER — TELEPHONE (OUTPATIENT)
Dept: CARDIOLOGY | Facility: CLINIC | Age: 80
End: 2025-08-21
Payer: MEDICARE

## 2025-08-21 DIAGNOSIS — I10 ESSENTIAL HYPERTENSION: ICD-10-CM

## 2025-08-21 RX ORDER — PREDNISONE 50 MG/1
TABLET ORAL
Qty: 3 TABLET | Refills: 0 | Status: SHIPPED | OUTPATIENT
Start: 2025-08-21

## 2025-08-22 ENCOUNTER — HOSPITAL ENCOUNTER (OUTPATIENT)
Dept: RADIOLOGY | Facility: CLINIC | Age: 80
End: 2025-08-22
Payer: MEDICARE

## 2025-08-26 ENCOUNTER — HOSPITAL ENCOUNTER (OUTPATIENT)
Dept: RADIOLOGY | Facility: HOSPITAL | Age: 80
Discharge: HOME | End: 2025-08-26
Payer: MEDICARE

## 2025-08-26 VITALS
HEART RATE: 72 BPM | OXYGEN SATURATION: 100 % | SYSTOLIC BLOOD PRESSURE: 140 MMHG | RESPIRATION RATE: 17 BRPM | DIASTOLIC BLOOD PRESSURE: 79 MMHG

## 2025-08-26 DIAGNOSIS — R06.02 SHORTNESS OF BREATH ON EXERTION: ICD-10-CM

## 2025-08-26 DIAGNOSIS — I10 ESSENTIAL HYPERTENSION: ICD-10-CM

## 2025-08-26 DIAGNOSIS — R07.89 OTHER CHEST PAIN: ICD-10-CM

## 2025-08-26 DIAGNOSIS — R93.1 ABNORMAL FINDINGS ON DIAGNOSTIC IMAGING OF HEART AND CORONARY CIRCULATION: ICD-10-CM

## 2025-08-26 PROCEDURE — 76377 3D RENDER W/INTRP POSTPROCES: CPT | Performed by: RADIOLOGY

## 2025-08-26 PROCEDURE — 75580 N-INVAS EST C FFR SW ALY CTA: CPT

## 2025-08-26 PROCEDURE — 2500000001 HC RX 250 WO HCPCS SELF ADMINISTERED DRUGS (ALT 637 FOR MEDICARE OP): Performed by: RADIOLOGY

## 2025-08-26 PROCEDURE — 75574 CT ANGIO HRT W/3D IMAGE: CPT

## 2025-08-26 PROCEDURE — 75571 CT HRT W/O DYE W/CA TEST: CPT

## 2025-08-26 PROCEDURE — 2500000004 HC RX 250 GENERAL PHARMACY W/ HCPCS (ALT 636 FOR OP/ED): Performed by: RADIOLOGY

## 2025-08-26 RX ORDER — METOPROLOL TARTRATE 1 MG/ML
5 INJECTION, SOLUTION INTRAVENOUS
Status: COMPLETED | OUTPATIENT
Start: 2025-08-26 | End: 2025-08-26

## 2025-08-26 RX ORDER — NITROGLYCERIN 0.4 MG/1
0.8 TABLET SUBLINGUAL
Status: COMPLETED | OUTPATIENT
Start: 2025-08-26 | End: 2025-08-26

## 2025-08-26 RX ORDER — METOPROLOL TARTRATE 100 MG/1
100 TABLET ORAL
Status: DISCONTINUED | OUTPATIENT
Start: 2025-08-26 | End: 2025-08-27 | Stop reason: HOSPADM

## 2025-08-26 RX ORDER — METOPROLOL TARTRATE 1 MG/ML
5 INJECTION, SOLUTION INTRAVENOUS
Status: DISCONTINUED | OUTPATIENT
Start: 2025-08-26 | End: 2025-08-27 | Stop reason: HOSPADM

## 2025-08-26 RX ORDER — METOPROLOL TARTRATE 100 MG/1
100 TABLET ORAL
Status: COMPLETED | OUTPATIENT
Start: 2025-08-26 | End: 2025-08-26

## 2025-08-26 RX ADMIN — METOPROLOL TARTRATE 100 MG: 100 TABLET ORAL at 09:35

## 2025-08-26 RX ADMIN — METOPROLOL TARTRATE 5 MG: 5 INJECTION, SOLUTION INTRAVENOUS at 10:15

## 2025-08-26 RX ADMIN — NITROGLYCERIN 0.8 MG: 0.4 TABLET SUBLINGUAL at 10:22

## 2025-08-26 RX ADMIN — METOPROLOL TARTRATE 5 MG: 5 INJECTION, SOLUTION INTRAVENOUS at 09:15

## 2025-08-26 RX ADMIN — METOPROLOL TARTRATE 5 MG: 5 INJECTION, SOLUTION INTRAVENOUS at 09:25

## 2025-08-28 ENCOUNTER — APPOINTMENT (OUTPATIENT)
Dept: CARDIOLOGY | Facility: CLINIC | Age: 80
End: 2025-08-28
Payer: MEDICARE

## 2025-09-02 ENCOUNTER — APPOINTMENT (OUTPATIENT)
Dept: CARDIOLOGY | Facility: CLINIC | Age: 80
End: 2025-09-02
Payer: MEDICARE

## 2025-10-17 ENCOUNTER — APPOINTMENT (OUTPATIENT)
Dept: PRIMARY CARE | Facility: CLINIC | Age: 80
End: 2025-10-17
Payer: MEDICARE

## 2025-11-03 ENCOUNTER — APPOINTMENT (OUTPATIENT)
Dept: CARDIOLOGY | Facility: CLINIC | Age: 80
End: 2025-11-03
Payer: MEDICARE